# Patient Record
Sex: FEMALE | Race: WHITE | NOT HISPANIC OR LATINO | Employment: OTHER | URBAN - METROPOLITAN AREA
[De-identification: names, ages, dates, MRNs, and addresses within clinical notes are randomized per-mention and may not be internally consistent; named-entity substitution may affect disease eponyms.]

---

## 2022-04-22 ENCOUNTER — APPOINTMENT (OUTPATIENT)
Dept: CT IMAGING | Facility: CLINIC | Age: 87
DRG: 193 | End: 2022-04-22
Attending: EMERGENCY MEDICINE
Payer: COMMERCIAL

## 2022-04-22 ENCOUNTER — HOSPITAL ENCOUNTER (INPATIENT)
Facility: CLINIC | Age: 87
LOS: 6 days | Discharge: HOME | DRG: 193 | End: 2022-04-28
Attending: EMERGENCY MEDICINE | Admitting: INTERNAL MEDICINE
Payer: COMMERCIAL

## 2022-04-22 ENCOUNTER — APPOINTMENT (OUTPATIENT)
Dept: ULTRASOUND IMAGING | Facility: CLINIC | Age: 87
DRG: 193 | End: 2022-04-22
Attending: INTERNAL MEDICINE
Payer: COMMERCIAL
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
## 2022-04-22 DIAGNOSIS — I50.21 ACUTE SYSTOLIC CONGESTIVE HEART FAILURE (H): ICD-10-CM

## 2022-04-22 DIAGNOSIS — J18.9 PNEUMONIA OF BOTH LOWER LOBES DUE TO INFECTIOUS ORGANISM: ICD-10-CM

## 2022-04-22 DIAGNOSIS — R06.00 DYSPNEA, UNSPECIFIED TYPE: ICD-10-CM

## 2022-04-22 DIAGNOSIS — J18.9 COMMUNITY ACQUIRED PNEUMONIA, UNSPECIFIED LATERALITY: Primary | ICD-10-CM

## 2022-04-22 DIAGNOSIS — K21.00 GASTROESOPHAGEAL REFLUX DISEASE WITH ESOPHAGITIS, UNSPECIFIED WHETHER HEMORRHAGE: ICD-10-CM

## 2022-04-22 PROBLEM — E83.39 HYPERPHOSPHATEMIA: Status: ACTIVE | Noted: 2020-08-20

## 2022-04-22 PROBLEM — N18.4 CHRONIC KIDNEY DISEASE, STAGE 4 (SEVERE) (H): Status: ACTIVE | Noted: 2019-12-31

## 2022-04-22 PROBLEM — D84.9 IMMUNOSUPPRESSION (H): Status: ACTIVE | Noted: 2020-02-06

## 2022-04-22 PROBLEM — D64.9: Status: ACTIVE | Noted: 2020-12-01

## 2022-04-22 PROBLEM — I21.21: Status: ACTIVE | Noted: 2018-10-11

## 2022-04-22 PROBLEM — R06.09 DOE (DYSPNEA ON EXERTION): Status: ACTIVE | Noted: 2019-12-31

## 2022-04-22 PROBLEM — D50.9 ANEMIA, IRON DEFICIENCY: Status: ACTIVE | Noted: 2020-06-24

## 2022-04-22 PROBLEM — E56.9 VITAMIN DEFICIENCY: Status: ACTIVE | Noted: 2022-03-07

## 2022-04-22 PROBLEM — E66.9 OBESITY: Status: ACTIVE | Noted: 2019-12-31

## 2022-04-22 PROBLEM — Z96.651 HISTORY OF TOTAL RIGHT KNEE REPLACEMENT: Status: ACTIVE | Noted: 2021-02-11

## 2022-04-22 PROBLEM — R10.9 ABDOMINAL PAIN: Status: ACTIVE | Noted: 2020-06-08

## 2022-04-22 PROBLEM — R42 VERTIGO: Status: ACTIVE | Noted: 2020-02-04

## 2022-04-22 PROBLEM — E87.70 HYPERVOLEMIA: Status: ACTIVE | Noted: 2020-02-27

## 2022-04-22 PROBLEM — L29.9 PRURITUS: Status: ACTIVE | Noted: 2020-02-04

## 2022-04-22 PROBLEM — K57.92 DIVERTICULITIS: Status: ACTIVE | Noted: 2020-06-08

## 2022-04-22 PROBLEM — M17.12 PRIMARY OSTEOARTHRITIS OF LEFT KNEE: Status: ACTIVE | Noted: 2021-02-11

## 2022-04-22 PROBLEM — E78.5 HLD (HYPERLIPIDEMIA): Status: ACTIVE | Noted: 2020-01-01

## 2022-04-22 PROBLEM — N25.81 SECONDARY HYPERPARATHYROIDISM OF RENAL ORIGIN (H): Status: ACTIVE | Noted: 2020-02-04

## 2022-04-22 LAB
ANION GAP SERPL CALCULATED.3IONS-SCNC: 15 MMOL/L (ref 5–18)
ATRIAL RATE - MUSE: 53 BPM
BASOPHILS # BLD AUTO: 0 10E3/UL (ref 0–0.2)
BASOPHILS NFR BLD AUTO: 0 %
BNP SERPL-MCNC: 940 PG/ML (ref 0–167)
BUN SERPL-MCNC: 34 MG/DL (ref 8–28)
CALCIUM SERPL-MCNC: 8.3 MG/DL (ref 8.5–10.5)
CHLORIDE BLD-SCNC: 102 MMOL/L (ref 98–107)
CK SERPL-CCNC: 549 U/L (ref 30–190)
CO2 SERPL-SCNC: 22 MMOL/L (ref 22–31)
CREAT SERPL-MCNC: 1.96 MG/DL (ref 0.6–1.1)
DIASTOLIC BLOOD PRESSURE - MUSE: NORMAL MMHG
EOSINOPHIL # BLD AUTO: 0.1 10E3/UL (ref 0–0.7)
EOSINOPHIL NFR BLD AUTO: 0 %
ERYTHROCYTE [DISTWIDTH] IN BLOOD BY AUTOMATED COUNT: 16.2 % (ref 10–15)
ERYTHROCYTE [DISTWIDTH] IN BLOOD BY AUTOMATED COUNT: 16.2 % (ref 10–15)
GFR SERPL CREATININE-BSD FRML MDRD: 24 ML/MIN/1.73M2
GLUCOSE BLD-MCNC: 104 MG/DL (ref 70–125)
HCT VFR BLD AUTO: 28.6 % (ref 35–47)
HCT VFR BLD AUTO: 28.6 % (ref 35–47)
HGB BLD-MCNC: 9.2 G/DL (ref 11.7–15.7)
HGB BLD-MCNC: 9.3 G/DL (ref 11.7–15.7)
IMM GRANULOCYTES # BLD: 0.1 10E3/UL
IMM GRANULOCYTES NFR BLD: 1 %
INTERPRETATION ECG - MUSE: NORMAL
LACTATE SERPL-SCNC: 1.5 MMOL/L (ref 0.7–2)
LYMPHOCYTES # BLD AUTO: 1.7 10E3/UL (ref 0.8–5.3)
LYMPHOCYTES NFR BLD AUTO: 12 %
MAGNESIUM SERPL-MCNC: 1.8 MG/DL (ref 1.8–2.6)
MCH RBC QN AUTO: 27.5 PG (ref 26.5–33)
MCH RBC QN AUTO: 27.7 PG (ref 26.5–33)
MCHC RBC AUTO-ENTMCNC: 32.2 G/DL (ref 31.5–36.5)
MCHC RBC AUTO-ENTMCNC: 32.5 G/DL (ref 31.5–36.5)
MCV RBC AUTO: 85 FL (ref 78–100)
MCV RBC AUTO: 86 FL (ref 78–100)
MONOCYTES # BLD AUTO: 1.5 10E3/UL (ref 0–1.3)
MONOCYTES NFR BLD AUTO: 10 %
NEUTROPHILS # BLD AUTO: 11.4 10E3/UL (ref 1.6–8.3)
NEUTROPHILS NFR BLD AUTO: 77 %
NRBC # BLD AUTO: 0 10E3/UL
NRBC BLD AUTO-RTO: 0 /100
P AXIS - MUSE: 69 DEGREES
PLATELET # BLD AUTO: 186 10E3/UL (ref 150–450)
PLATELET # BLD AUTO: 223 10E3/UL (ref 150–450)
POTASSIUM BLD-SCNC: 3.7 MMOL/L (ref 3.5–5)
PR INTERVAL - MUSE: 160 MS
QRS DURATION - MUSE: 90 MS
QT - MUSE: 694 MS
QTC - MUSE: 651 MS
R AXIS - MUSE: 47 DEGREES
RBC # BLD AUTO: 3.34 10E6/UL (ref 3.8–5.2)
RBC # BLD AUTO: 3.36 10E6/UL (ref 3.8–5.2)
SODIUM SERPL-SCNC: 139 MMOL/L (ref 136–145)
SYSTOLIC BLOOD PRESSURE - MUSE: NORMAL MMHG
T AXIS - MUSE: 59 DEGREES
TROPONIN I SERPL-MCNC: 0.08 NG/ML (ref 0–0.29)
VENTRICULAR RATE- MUSE: 53 BPM
WBC # BLD AUTO: 14.7 10E3/UL (ref 4–11)
WBC # BLD AUTO: 15.7 10E3/UL (ref 4–11)

## 2022-04-22 PROCEDURE — 80048 BASIC METABOLIC PNL TOTAL CA: CPT | Performed by: EMERGENCY MEDICINE

## 2022-04-22 PROCEDURE — 93005 ELECTROCARDIOGRAM TRACING: CPT

## 2022-04-22 PROCEDURE — 87077 CULTURE AEROBIC IDENTIFY: CPT | Performed by: INTERNAL MEDICINE

## 2022-04-22 PROCEDURE — 93010 ELECTROCARDIOGRAM REPORT: CPT | Performed by: INTERNAL MEDICINE

## 2022-04-22 PROCEDURE — 85027 COMPLETE CBC AUTOMATED: CPT | Performed by: INTERNAL MEDICINE

## 2022-04-22 PROCEDURE — 99223 1ST HOSP IP/OBS HIGH 75: CPT | Mod: AI | Performed by: INTERNAL MEDICINE

## 2022-04-22 PROCEDURE — 87149 DNA/RNA DIRECT PROBE: CPT | Performed by: INTERNAL MEDICINE

## 2022-04-22 PROCEDURE — 93005 ELECTROCARDIOGRAM TRACING: CPT | Performed by: EMERGENCY MEDICINE

## 2022-04-22 PROCEDURE — 94640 AIRWAY INHALATION TREATMENT: CPT

## 2022-04-22 PROCEDURE — 83605 ASSAY OF LACTIC ACID: CPT | Performed by: EMERGENCY MEDICINE

## 2022-04-22 PROCEDURE — 84145 PROCALCITONIN (PCT): CPT | Performed by: INTERNAL MEDICINE

## 2022-04-22 PROCEDURE — 250N000011 HC RX IP 250 OP 636: Performed by: EMERGENCY MEDICINE

## 2022-04-22 PROCEDURE — 99285 EMERGENCY DEPT VISIT HI MDM: CPT | Mod: 25

## 2022-04-22 PROCEDURE — 250N000013 HC RX MED GY IP 250 OP 250 PS 637: Performed by: EMERGENCY MEDICINE

## 2022-04-22 PROCEDURE — 36415 COLL VENOUS BLD VENIPUNCTURE: CPT | Performed by: EMERGENCY MEDICINE

## 2022-04-22 PROCEDURE — 84484 ASSAY OF TROPONIN QUANT: CPT | Performed by: EMERGENCY MEDICINE

## 2022-04-22 PROCEDURE — 250N000009 HC RX 250: Performed by: EMERGENCY MEDICINE

## 2022-04-22 PROCEDURE — 120N000001 HC R&B MED SURG/OB

## 2022-04-22 PROCEDURE — 85025 COMPLETE CBC W/AUTO DIFF WBC: CPT | Performed by: EMERGENCY MEDICINE

## 2022-04-22 PROCEDURE — 82550 ASSAY OF CK (CPK): CPT | Performed by: EMERGENCY MEDICINE

## 2022-04-22 PROCEDURE — 83880 ASSAY OF NATRIURETIC PEPTIDE: CPT | Performed by: EMERGENCY MEDICINE

## 2022-04-22 PROCEDURE — 83735 ASSAY OF MAGNESIUM: CPT | Performed by: EMERGENCY MEDICINE

## 2022-04-22 PROCEDURE — 71275 CT ANGIOGRAPHY CHEST: CPT

## 2022-04-22 PROCEDURE — 0HQ0XZZ REPAIR SCALP SKIN, EXTERNAL APPROACH: ICD-10-PCS | Performed by: EMERGENCY MEDICINE

## 2022-04-22 PROCEDURE — 93970 EXTREMITY STUDY: CPT

## 2022-04-22 RX ORDER — IPRATROPIUM BROMIDE AND ALBUTEROL SULFATE 2.5; .5 MG/3ML; MG/3ML
3 SOLUTION RESPIRATORY (INHALATION) ONCE
Status: COMPLETED | OUTPATIENT
Start: 2022-04-22 | End: 2022-04-22

## 2022-04-22 RX ORDER — GABAPENTIN 100 MG/1
100 CAPSULE ORAL 2 TIMES DAILY
COMMUNITY
Start: 2022-03-28

## 2022-04-22 RX ORDER — DOXYCYCLINE 100 MG/10ML
100 INJECTION, POWDER, LYOPHILIZED, FOR SOLUTION INTRAVENOUS EVERY 12 HOURS
Status: DISCONTINUED | OUTPATIENT
Start: 2022-04-22 | End: 2022-04-25

## 2022-04-22 RX ORDER — IOPAMIDOL 755 MG/ML
100 INJECTION, SOLUTION INTRAVASCULAR ONCE
Status: COMPLETED | OUTPATIENT
Start: 2022-04-22 | End: 2022-04-22

## 2022-04-22 RX ORDER — TORSEMIDE 20 MG/1
40 TABLET ORAL DAILY
Status: DISCONTINUED | OUTPATIENT
Start: 2022-04-23 | End: 2022-04-28 | Stop reason: HOSPADM

## 2022-04-22 RX ORDER — FAMOTIDINE 20 MG/1
20 TABLET, FILM COATED ORAL 2 TIMES DAILY
Status: ON HOLD | COMMUNITY
Start: 2022-04-04 | End: 2022-04-28

## 2022-04-22 RX ORDER — BENZONATATE 100 MG/1
100 CAPSULE ORAL 3 TIMES DAILY PRN
Status: DISCONTINUED | OUTPATIENT
Start: 2022-04-22 | End: 2022-04-28 | Stop reason: HOSPADM

## 2022-04-22 RX ORDER — CARVEDILOL 25 MG/1
25 TABLET ORAL 2 TIMES DAILY
COMMUNITY
Start: 2022-03-25

## 2022-04-22 RX ORDER — CEFTRIAXONE 2 G/1
2 INJECTION, POWDER, FOR SOLUTION INTRAMUSCULAR; INTRAVENOUS EVERY 24 HOURS
Status: DISCONTINUED | OUTPATIENT
Start: 2022-04-22 | End: 2022-04-28 | Stop reason: HOSPADM

## 2022-04-22 RX ORDER — ACETAMINOPHEN 500 MG
1000 TABLET ORAL 3 TIMES DAILY
COMMUNITY

## 2022-04-22 RX ORDER — FAMOTIDINE 20 MG/1
20 TABLET, FILM COATED ORAL DAILY
Status: DISCONTINUED | OUTPATIENT
Start: 2022-04-23 | End: 2022-04-24

## 2022-04-22 RX ORDER — CARVEDILOL 25 MG/1
25 TABLET ORAL 2 TIMES DAILY
Status: DISCONTINUED | OUTPATIENT
Start: 2022-04-23 | End: 2022-04-28 | Stop reason: HOSPADM

## 2022-04-22 RX ORDER — TORSEMIDE 20 MG/1
20-40 TABLET ORAL SEE ADMIN INSTRUCTIONS
Status: DISCONTINUED | OUTPATIENT
Start: 2022-04-22 | End: 2022-04-22

## 2022-04-22 RX ORDER — TORSEMIDE 20 MG/1
20-40 TABLET ORAL SEE ADMIN INSTRUCTIONS
COMMUNITY
Start: 2022-04-16

## 2022-04-22 RX ORDER — ACETAMINOPHEN 500 MG
1000 TABLET ORAL 3 TIMES DAILY
Status: DISCONTINUED | OUTPATIENT
Start: 2022-04-23 | End: 2022-04-28 | Stop reason: HOSPADM

## 2022-04-22 RX ORDER — HEPARIN SODIUM 5000 [USP'U]/.5ML
5000 INJECTION, SOLUTION INTRAVENOUS; SUBCUTANEOUS EVERY 12 HOURS
Status: DISCONTINUED | OUTPATIENT
Start: 2022-04-22 | End: 2022-04-28 | Stop reason: HOSPADM

## 2022-04-22 RX ORDER — ROSUVASTATIN CALCIUM 5 MG/1
5 TABLET, COATED ORAL AT BEDTIME
COMMUNITY
Start: 2022-02-19

## 2022-04-22 RX ORDER — ACETAMINOPHEN 650 MG/1
650 SUPPOSITORY RECTAL EVERY 6 HOURS PRN
Status: DISCONTINUED | OUTPATIENT
Start: 2022-04-22 | End: 2022-04-28 | Stop reason: HOSPADM

## 2022-04-22 RX ORDER — LEVOTHYROXINE SODIUM 50 UG/1
50 TABLET ORAL DAILY
Status: DISCONTINUED | OUTPATIENT
Start: 2022-04-23 | End: 2022-04-28 | Stop reason: HOSPADM

## 2022-04-22 RX ORDER — TORSEMIDE 20 MG/1
20 TABLET ORAL DAILY
Status: DISCONTINUED | OUTPATIENT
Start: 2022-04-23 | End: 2022-04-28 | Stop reason: HOSPADM

## 2022-04-22 RX ORDER — GABAPENTIN 100 MG/1
100 CAPSULE ORAL 2 TIMES DAILY
Status: DISCONTINUED | OUTPATIENT
Start: 2022-04-22 | End: 2022-04-28 | Stop reason: HOSPADM

## 2022-04-22 RX ORDER — ROSUVASTATIN CALCIUM 5 MG/1
5 TABLET, COATED ORAL AT BEDTIME
Status: DISCONTINUED | OUTPATIENT
Start: 2022-04-22 | End: 2022-04-28 | Stop reason: HOSPADM

## 2022-04-22 RX ORDER — AMLODIPINE BESYLATE 5 MG/1
5 TABLET ORAL 2 TIMES DAILY
Status: DISCONTINUED | OUTPATIENT
Start: 2022-04-22 | End: 2022-04-28 | Stop reason: HOSPADM

## 2022-04-22 RX ORDER — AMLODIPINE BESYLATE 5 MG/1
5 TABLET ORAL 2 TIMES DAILY
COMMUNITY
Start: 2022-02-21

## 2022-04-22 RX ORDER — LIDOCAINE 40 MG/G
CREAM TOPICAL
Status: DISCONTINUED | OUTPATIENT
Start: 2022-04-22 | End: 2022-04-28 | Stop reason: HOSPADM

## 2022-04-22 RX ORDER — ACETAMINOPHEN 325 MG/1
650 TABLET ORAL EVERY 6 HOURS PRN
Status: DISCONTINUED | OUTPATIENT
Start: 2022-04-22 | End: 2022-04-28 | Stop reason: HOSPADM

## 2022-04-22 RX ORDER — LEVOTHYROXINE SODIUM 50 UG/1
50 TABLET ORAL DAILY
COMMUNITY
Start: 2022-02-25

## 2022-04-22 RX ORDER — ACETAMINOPHEN 325 MG/1
650 TABLET ORAL ONCE
Status: COMPLETED | OUTPATIENT
Start: 2022-04-22 | End: 2022-04-22

## 2022-04-22 RX ORDER — ALBUTEROL SULFATE 0.83 MG/ML
2.5 SOLUTION RESPIRATORY (INHALATION) EVERY 6 HOURS PRN
Status: DISCONTINUED | OUTPATIENT
Start: 2022-04-22 | End: 2022-04-23

## 2022-04-22 RX ADMIN — IOPAMIDOL 75 ML: 755 INJECTION, SOLUTION INTRAVENOUS at 21:52

## 2022-04-22 RX ADMIN — ACETAMINOPHEN 650 MG: 325 TABLET ORAL at 19:42

## 2022-04-22 RX ADMIN — IPRATROPIUM BROMIDE AND ALBUTEROL SULFATE 3 ML: 2.5; .5 SOLUTION RESPIRATORY (INHALATION) at 20:27

## 2022-04-22 ASSESSMENT — ACTIVITIES OF DAILY LIVING (ADL)
WEAR_GLASSES_OR_BLIND: NO
DRESSING/BATHING_DIFFICULTY: NO
DOING_ERRANDS_INDEPENDENTLY_DIFFICULTY: NO
DIFFICULTY_EATING/SWALLOWING: NO
TRANSFERRING: 0-->ASSISTANCE NEEDED (DEVELOPMETNALLY APPROPRIATE)
EQUIPMENT_CURRENTLY_USED_AT_HOME: WALKER, ROLLING
NUMBER_OF_TIMES_PATIENT_HAS_FALLEN_WITHIN_LAST_SIX_MONTHS: 1
WALKING_OR_CLIMBING_STAIRS_DIFFICULTY: NO
TOILETING_ISSUES: NO
ADLS_ACUITY_SCORE: 7
ADLS_ACUITY_SCORE: 4
FALL_HISTORY_WITHIN_LAST_SIX_MONTHS: YES
TRANSFERRING: 1-->ASSISTANCE (EQUIPMENT/PERSON) NEEDED
CONCENTRATING,_REMEMBERING_OR_MAKING_DECISIONS_DIFFICULTY: NO
ADLS_ACUITY_SCORE: 7
CHANGE_IN_FUNCTIONAL_STATUS_SINCE_ONSET_OF_CURRENT_ILLNESS/INJURY: NO

## 2022-04-22 ASSESSMENT — ENCOUNTER SYMPTOMS
HEMATURIA: 0
DYSURIA: 0
DIARRHEA: 1
SHORTNESS OF BREATH: 1
ABDOMINAL PAIN: 0
COUGH: 1

## 2022-04-22 NOTE — ED TRIAGE NOTES
Patient is here from the clinic with dx of pneumonia. She does feel short of breath with a cough. She did fall last night and laid on the floor for two hours. She also has a laceration to the back of the head which the clinic put staples in.

## 2022-04-22 NOTE — ED PROVIDER NOTES
Expected Patient Referral to ED  4:33 PM    Referring Clinic/Provider:  [facility]     Reason for referral/Clinical facts:  Fell at 2 AM, denies LOC, has scalp laceration. Was on ground for 2 hours last night, WBC elevated. CXR shows infiltrates. Lives alone. CT head is negative. Needs admission for IVFs, abx. Tried direct admission but admitting doc wanted chest CT.     Recommendations provided:  Send to ED for further evaluation    Caller was informed that this institution does possess the capabilities and/or resources to provide for patient and should be transferred to our facility.    Discussed that if direct admit is sought and any hurdles are encountered, this ED would be happy to see the patient and evaluate.    Informed caller that recommendations provided are recommendations based only on the facts provided and that they responsible to accept or reject the advice, or to seek a formal in person consultation as needed and that this ED will see/treat patient should they arrive.      EBER GIBSON MD  Emergency Medicine  [facility] EMERGENCY ROOM  [address] [facility] [id number] [phone]       Eber Gibson MD  04/22/22 2127

## 2022-04-23 LAB
ALBUMIN SERPL-MCNC: 2.9 G/DL (ref 3.5–5)
ALP SERPL-CCNC: 76 U/L (ref 45–120)
ALT SERPL W P-5'-P-CCNC: 14 U/L (ref 0–45)
ANION GAP SERPL CALCULATED.3IONS-SCNC: 14 MMOL/L (ref 5–18)
AST SERPL W P-5'-P-CCNC: 29 U/L (ref 0–40)
BASE EXCESS BLDV CALC-SCNC: 0.5 MMOL/L
BILIRUB SERPL-MCNC: 0.6 MG/DL (ref 0–1)
BUN SERPL-MCNC: 33 MG/DL (ref 8–28)
C PNEUM DNA SPEC QL NAA+PROBE: NOT DETECTED
CALCIUM SERPL-MCNC: 8.5 MG/DL (ref 8.5–10.5)
CHLORIDE BLD-SCNC: 103 MMOL/L (ref 98–107)
CK SERPL-CCNC: 415 U/L (ref 30–190)
CO2 SERPL-SCNC: 22 MMOL/L (ref 22–31)
CREAT SERPL-MCNC: 1.9 MG/DL (ref 0.6–1.1)
FLUAV H1 2009 PAND RNA SPEC QL NAA+PROBE: NOT DETECTED
FLUAV H1 RNA SPEC QL NAA+PROBE: NOT DETECTED
FLUAV H3 RNA SPEC QL NAA+PROBE: NOT DETECTED
FLUAV RNA SPEC QL NAA+PROBE: NOT DETECTED
FLUBV RNA SPEC QL NAA+PROBE: NOT DETECTED
GFR SERPL CREATININE-BSD FRML MDRD: 25 ML/MIN/1.73M2
GLUCOSE BLD-MCNC: 107 MG/DL (ref 70–125)
HADV DNA SPEC QL NAA+PROBE: NOT DETECTED
HCO3 BLDV-SCNC: 24 MMOL/L (ref 24–30)
HCOV PNL SPEC NAA+PROBE: NOT DETECTED
HMPV RNA SPEC QL NAA+PROBE: NOT DETECTED
HOLD SPECIMEN: NORMAL
HPIV1 RNA SPEC QL NAA+PROBE: NOT DETECTED
HPIV2 RNA SPEC QL NAA+PROBE: NOT DETECTED
HPIV3 RNA SPEC QL NAA+PROBE: NOT DETECTED
HPIV4 RNA SPEC QL NAA+PROBE: NOT DETECTED
LACTATE SERPL-SCNC: 1.4 MMOL/L (ref 0.7–2)
M PNEUMO DNA SPEC QL NAA+PROBE: NOT DETECTED
MAGNESIUM SERPL-MCNC: 1.8 MG/DL (ref 1.8–2.6)
OXYHGB MFR BLDV: 75.1 % (ref 70–75)
PCO2 BLDV: 43 MM HG (ref 35–50)
PH BLDV: 7.38 [PH] (ref 7.35–7.45)
PO2 BLDV: 40 MM HG (ref 25–47)
POTASSIUM BLD-SCNC: 3.6 MMOL/L (ref 3.5–5)
PROCALCITONIN SERPL-MCNC: 2.74 NG/ML (ref 0–0.49)
PROT SERPL-MCNC: 6.2 G/DL (ref 6–8)
RSV RNA SPEC QL NAA+PROBE: NOT DETECTED
RSV RNA SPEC QL NAA+PROBE: NOT DETECTED
RV+EV RNA SPEC QL NAA+PROBE: NOT DETECTED
SAO2 % BLDV: 76.7 % (ref 70–75)
SODIUM SERPL-SCNC: 139 MMOL/L (ref 136–145)
TROPONIN I SERPL-MCNC: 0.07 NG/ML (ref 0–0.29)

## 2022-04-23 PROCEDURE — 36415 COLL VENOUS BLD VENIPUNCTURE: CPT | Performed by: INTERNAL MEDICINE

## 2022-04-23 PROCEDURE — 5A09357 ASSISTANCE WITH RESPIRATORY VENTILATION, LESS THAN 24 CONSECUTIVE HOURS, CONTINUOUS POSITIVE AIRWAY PRESSURE: ICD-10-PCS | Performed by: INTERNAL MEDICINE

## 2022-04-23 PROCEDURE — 87581 M.PNEUMON DNA AMP PROBE: CPT | Performed by: INTERNAL MEDICINE

## 2022-04-23 PROCEDURE — 99233 SBSQ HOSP IP/OBS HIGH 50: CPT | Performed by: INTERNAL MEDICINE

## 2022-04-23 PROCEDURE — 999N000157 HC STATISTIC RCP TIME EA 10 MIN

## 2022-04-23 PROCEDURE — 83605 ASSAY OF LACTIC ACID: CPT | Performed by: INTERNAL MEDICINE

## 2022-04-23 PROCEDURE — 120N000001 HC R&B MED SURG/OB

## 2022-04-23 PROCEDURE — 82805 BLOOD GASES W/O2 SATURATION: CPT | Performed by: INTERNAL MEDICINE

## 2022-04-23 PROCEDURE — 80053 COMPREHEN METABOLIC PANEL: CPT | Performed by: INTERNAL MEDICINE

## 2022-04-23 PROCEDURE — 82550 ASSAY OF CK (CPK): CPT | Performed by: INTERNAL MEDICINE

## 2022-04-23 PROCEDURE — 83735 ASSAY OF MAGNESIUM: CPT | Performed by: INTERNAL MEDICINE

## 2022-04-23 PROCEDURE — 999N000156 HC STATISTIC RCP CONSULT EA 30 MIN

## 2022-04-23 PROCEDURE — 87205 SMEAR GRAM STAIN: CPT | Performed by: INTERNAL MEDICINE

## 2022-04-23 PROCEDURE — 250N000011 HC RX IP 250 OP 636: Performed by: INTERNAL MEDICINE

## 2022-04-23 PROCEDURE — 250N000013 HC RX MED GY IP 250 OP 250 PS 637: Performed by: INTERNAL MEDICINE

## 2022-04-23 PROCEDURE — 94640 AIRWAY INHALATION TREATMENT: CPT

## 2022-04-23 PROCEDURE — 94660 CPAP INITIATION&MGMT: CPT

## 2022-04-23 PROCEDURE — 84484 ASSAY OF TROPONIN QUANT: CPT | Performed by: INTERNAL MEDICINE

## 2022-04-23 PROCEDURE — 87040 BLOOD CULTURE FOR BACTERIA: CPT | Performed by: INTERNAL MEDICINE

## 2022-04-23 PROCEDURE — 250N000009 HC RX 250: Performed by: INTERNAL MEDICINE

## 2022-04-23 RX ORDER — ALBUTEROL SULFATE 0.83 MG/ML
2.5 SOLUTION RESPIRATORY (INHALATION) EVERY 6 HOURS PRN
Status: DISCONTINUED | OUTPATIENT
Start: 2022-04-23 | End: 2022-04-23

## 2022-04-23 RX ORDER — MINERAL OIL/HYDROPHIL PETROLAT
OINTMENT (GRAM) TOPICAL DAILY
Status: DISCONTINUED | OUTPATIENT
Start: 2022-04-23 | End: 2022-04-28 | Stop reason: HOSPADM

## 2022-04-23 RX ORDER — ALBUTEROL SULFATE 90 UG/1
2 AEROSOL, METERED RESPIRATORY (INHALATION) EVERY 6 HOURS PRN
Status: DISCONTINUED | OUTPATIENT
Start: 2022-04-23 | End: 2022-04-28 | Stop reason: HOSPADM

## 2022-04-23 RX ADMIN — GUAIFENESIN 10 ML: 100 SOLUTION ORAL at 14:05

## 2022-04-23 RX ADMIN — BENZONATATE 100 MG: 100 CAPSULE ORAL at 01:17

## 2022-04-23 RX ADMIN — GABAPENTIN 100 MG: 100 CAPSULE ORAL at 01:16

## 2022-04-23 RX ADMIN — FAMOTIDINE 20 MG: 20 TABLET ORAL at 08:05

## 2022-04-23 RX ADMIN — HEPARIN SODIUM 5000 UNITS: 5000 INJECTION, SOLUTION INTRAVENOUS; SUBCUTANEOUS at 21:54

## 2022-04-23 RX ADMIN — CEFTRIAXONE SODIUM 2 G: 2 INJECTION, POWDER, FOR SOLUTION INTRAMUSCULAR; INTRAVENOUS at 01:19

## 2022-04-23 RX ADMIN — DOXYCYCLINE 100 MG: 100 INJECTION, POWDER, LYOPHILIZED, FOR SOLUTION INTRAVENOUS at 02:14

## 2022-04-23 RX ADMIN — ACETAMINOPHEN 1000 MG: 500 TABLET, FILM COATED ORAL at 13:45

## 2022-04-23 RX ADMIN — AMLODIPINE BESYLATE 5 MG: 5 TABLET ORAL at 01:16

## 2022-04-23 RX ADMIN — CARVEDILOL 25 MG: 25 TABLET, FILM COATED ORAL at 08:05

## 2022-04-23 RX ADMIN — AMLODIPINE BESYLATE 5 MG: 5 TABLET ORAL at 08:04

## 2022-04-23 RX ADMIN — ACETAMINOPHEN 1000 MG: 500 TABLET, FILM COATED ORAL at 20:54

## 2022-04-23 RX ADMIN — TORSEMIDE 20 MG: 20 TABLET ORAL at 17:07

## 2022-04-23 RX ADMIN — ALBUTEROL SULFATE 2.5 MG: 2.5 SOLUTION RESPIRATORY (INHALATION) at 17:46

## 2022-04-23 RX ADMIN — GABAPENTIN 100 MG: 100 CAPSULE ORAL at 08:05

## 2022-04-23 RX ADMIN — CEFTRIAXONE SODIUM 2 G: 2 INJECTION, POWDER, FOR SOLUTION INTRAMUSCULAR; INTRAVENOUS at 21:50

## 2022-04-23 RX ADMIN — GABAPENTIN 100 MG: 100 CAPSULE ORAL at 20:54

## 2022-04-23 RX ADMIN — AMLODIPINE BESYLATE 5 MG: 5 TABLET ORAL at 20:54

## 2022-04-23 RX ADMIN — ACETAMINOPHEN 650 MG: 325 TABLET ORAL at 01:26

## 2022-04-23 RX ADMIN — ALBUTEROL SULFATE 2 PUFF: 90 AEROSOL, METERED RESPIRATORY (INHALATION) at 21:59

## 2022-04-23 RX ADMIN — LEVOTHYROXINE SODIUM 50 MCG: 0.05 TABLET ORAL at 06:50

## 2022-04-23 RX ADMIN — CARVEDILOL 25 MG: 25 TABLET, FILM COATED ORAL at 20:54

## 2022-04-23 RX ADMIN — WHITE PETROLATUM: 1.75 OINTMENT TOPICAL at 13:45

## 2022-04-23 RX ADMIN — DOXYCYCLINE 100 MG: 100 INJECTION, POWDER, LYOPHILIZED, FOR SOLUTION INTRAVENOUS at 14:39

## 2022-04-23 RX ADMIN — HEPARIN SODIUM 5000 UNITS: 5000 INJECTION, SOLUTION INTRAVENOUS; SUBCUTANEOUS at 01:19

## 2022-04-23 RX ADMIN — HEPARIN SODIUM 5000 UNITS: 5000 INJECTION, SOLUTION INTRAVENOUS; SUBCUTANEOUS at 12:07

## 2022-04-23 RX ADMIN — ACETAMINOPHEN 325 MG: 325 TABLET ORAL at 22:36

## 2022-04-23 RX ADMIN — TORSEMIDE 40 MG: 20 TABLET ORAL at 08:03

## 2022-04-23 RX ADMIN — ACETAMINOPHEN 1000 MG: 500 TABLET, FILM COATED ORAL at 08:04

## 2022-04-23 ASSESSMENT — ACTIVITIES OF DAILY LIVING (ADL)
ADLS_ACUITY_SCORE: 10
ADLS_ACUITY_SCORE: 10
ADLS_ACUITY_SCORE: 4
ADLS_ACUITY_SCORE: 11
ADLS_ACUITY_SCORE: 10
ADLS_ACUITY_SCORE: 6
ADLS_ACUITY_SCORE: 11
ADLS_ACUITY_SCORE: 10
ADLS_ACUITY_SCORE: 14
ADLS_ACUITY_SCORE: 6
ADLS_ACUITY_SCORE: 11
ADLS_ACUITY_SCORE: 14
ADLS_ACUITY_SCORE: 14
ADLS_ACUITY_SCORE: 10
ADLS_ACUITY_SCORE: 11
ADLS_ACUITY_SCORE: 10
ADLS_ACUITY_SCORE: 4
ADLS_ACUITY_SCORE: 10
ADLS_ACUITY_SCORE: 10
ADLS_ACUITY_SCORE: 11
ADLS_ACUITY_SCORE: 10
ADLS_ACUITY_SCORE: 10

## 2022-04-23 NOTE — PROVIDER NOTIFICATION
RCAT Treatment Plan    Patient Score: 6    Patient Acuity: 4    Clinical Indication for Therapy: CHF    Therapy Ordered: PRN nebs    Assessment Summary: RCAT complete. Pt qualifies for PRN nebs. BS diminished with faint wheeze. Pt remains on 2 lpm. RT will continue to monitor.       04/23/22 1353   RCAT Assessment   Reason for Assessment CHF   Pulmonary Status 0   Surgical Status 0   Chest X-ray 0   Respiratory Pattern 0   Mental Status 0   Breath Sounds 4   Cough Effectiveness 0   Level of Activity 1   O2 Required for SpO2>=92% 1   Acuity Level (points) 6   Acuity Level  4         Chula Villatoro, RT    4/23/2022

## 2022-04-23 NOTE — PROGRESS NOTES
[facility] Daily Progress Note    Assessment/Plan:  89-year-old female with history of heart failure preserved ejection fraction, chronic kidney disease stage IV and history of necrotizing crescentic microscopic polyangiitis, anemia of chronic disease, who presented after suffering a fall in her home with scalp laceration.  Patient was noted to have mild elevation in total CK possible mild rhabdomyolysis.  Diagnosed with bilateral community-acquired pneumonia.    Bilateral community-acquired pneumonia  Acute respiratory failure with hypoxia  Procalcitonin elevated at 2.74.  White blood cell count elevated at 14.7  Respiratory panel PCR pending  Sputum culture pending specimen.  COVID PCR and influenza screens negative  Continue ceftriaxone 2 g IV every 24 hours, doxycycline 100 mg every 12 hours  Albuterol neb every 6 hours as needed  RCAT  Incentive spirometry  Will require repeat CT scan in 4 to 6 months to ensure resolution of pneumonia.    Possible acute on chronic diastolic congestive heart failure  Echocardiogram January 18, 2022, LVEF 71%, grade 1 left ventricular diastolic dysfunction.  No significant valvular disease.   and patient with chronic kidney disease.  Dr. Grey notation indicates elevated troponin however I have reviewed troponins available and no significant elevation was noted.  Bilateral lung infiltrates likely consistent with multifocal pneumonia.  Small pleural effusion noted.  Order torsemide 60 mg daily    Mild elevation in total CK  Likely mild rhabdomyolysis from recent fall.  Total CK improved from 549->415.  Hold rosuvastatin 5 mg nightly    Fall with scalp laceration  Status post suture placement in the ED.  Fall precautions  Lower extremity ultrasound negative for DVT  PT OT assessment tomorrow.    Chronic anemia normocytic.  Anemia of chronic kidney disease  Hemoglobin 9.2 g/dL  Monitor CBC    Obstructive sleep apnea on CPAP  Continue home CPAP home settings.    Chronic kidney  disease stage IV  History of necrotizing crescentic microscopic polyangiitis  Follows with Dr. Deandre Vernon, [facility]  Baseline creatinine around 2.0.  Creatinine 1.9 currently, stable.  Monitor renal function panel daily    Hypothyroidism  Hyperlipidemia  Fibromyalgia  Celiac sprue  Paroxysmal atrial fibrillation  Continue levothyroxine 50 mcg daily.  Continue gabapentin 100 mg twice daily  Continue famotidine 20 mg daily  Continue carvedilol 25 mg twice daily  Continue amlodipine 5 mg twice    COVID-19 status  SARS-CoV-2 PCR negative.    Diet: Combination Diet Low Saturated Fat Na <2400mg Diet, No Caffeine Diet  DVT Prophylaxis:  Pneumatic Compression Devices, fall risk  Code Status: Full Code    Active Problems:    Fall, initial encounter    Pneumonia of both lower lobes due to infectious organism    Dyspnea, unspecified type     LOS: 1 day     Barriers to discharge: Respiratory failure with hypoxia, bilateral community acquired pneumonia  Discharge Disposition: Pending PT OT assessment clinical improvement.  Patient will likely require increased services at home.      Subjective:  Ykui is currently on 5-1/2 L supplemental oxygen via nasal cannula.  She is saturating 92 to 94% during our conversation.  She reports improvement in cough.  Denies chest pain or shortness of breath at rest.  Patient is compliant with CPAP usage.  Denies any recent sick contacts.  Patient felt like she had a cold prior to her assessment in the emergency room.      acetaminophen  1,000 mg Oral TID     amLODIPine  5 mg Oral BID     carvedilol  25 mg Oral BID     cefTRIAXone  2 g Intravenous Q24H     doxycycline (VIBRAMYCIN) IV  100 mg Intravenous Q12H     famotidine  20 mg Oral Daily     gabapentin  100 mg Oral BID     heparin ANTICOAGULANT  5,000 Units Subcutaneous Q12H     levothyroxine  50 mcg Oral Daily     [Held by provider] rosuvastatin  5 mg Oral At Bedtime     sodium chloride (PF)  3 mL Intracatheter Q8H      torsemide  20 mg Oral Daily     torsemide  40 mg Oral Daily       Objective:  Vital signs in last 24 hours:  Temp:  [97.6  F (36.4  C)-99.8  F (37.7  C)] 99.8  F (37.7  C)  Pulse:  [51-82] 82  Resp:  [13-29] 20  BP: (122-146)/(58-78) 143/62  SpO2:  [91 %-96 %] 92 %  Weight:   Weight:   @THISENCWEIGHTS(1)@  Weight change:   Body mass index is 36.39 kg/m .    Intake/Output last 3 shifts:  I/O last 3 completed shifts:  In: 50 [P.O.:50]  Out: 550 [Urine:550]  Intake/Output this shift:  No intake/output data recorded.    Review of Systems:   As per subjective, all others negative.    Physical Exam:    GENERAL:  Alert, appears comfortable, in no acute distress, appears stated age   HEAD:  Normocephalic, skin laceration over scalp with sutures in place.   NECK: Supple, symmetrical, trachea midline   BACK:   Symmetric, no curvature, ROM normal   LUNGS:    Diminished breath sounds, no rales, rhonchi, or wheezing, symmetric chest rise on inhalation, respirations unlabored   CHEST WALL:  No tenderness or deformity   HEART:  Regular rate and rhythm, S1 and S2 normal, no murmur, rub, or gallop    ABDOMEN:   Soft, non-tender, bowel sounds active all four quadrants, no masses, no organomegaly, no rebound or guarding   EXTREMITIES: Extremities normal, atraumatic, no cyanosis or edema.  Dry skin noted over bilateral lower extremities.   SKIN: Dry to touch, no exanthems in the visualized areas   NEURO: Alert, oriented x3, moves all four extremities freely, non-focal   PSYCH: Cooperative, behavior is appropriate      Cardiographics:   I personally reviewed.  ECG: Sinus rhythm, premature atrial contraction, poor baseline EKG with some mild interference.  No obvious ST or T wave changes noted.  When compared to previous EKG no changes noted excluding PAC.    Imaging:  Personally Reviewed.  Results for orders placed or performed during the hospital encounter of 04/22/22   CT Chest Pulmonary Embolism w Contrast    Impression     IMPRESSION:  1.  There is no pulmonary embolus, aortic aneurysm or dissection.  2.  Bilateral multifocal pneumonia.  3.  Small right pleural effusion.  4.  Several mildly enlarged mediastinal and right hilar lymph nodes.  5.  Several small indeterminate lung nodules. Follow-up recommended.    Recommendations for one or multiple incidental lung nodules < 6mm :    Low risk patients: No routine follow-up.    High risk patients: Optional follow-up CT at 12 months; if unchanged, no further follow-up.    *Low Risk: Minimal or absent history of smoking or other known risk factors.  *Nonsolid (ground glass) or partly solid nodules may require longer follow-up to exclude indolent adenocarcinoma.  *Recommendations based on Guidelines for the Management of Incidental Pulmonary Nodules Detected at CT: From the Fleischner Society 2017, Radiology 2017.     US Lower Extremity Venous Duplex Bilateral    Impression    IMPRESSION:  1.  No deep venous thrombosis in the bilateral lower extremities.       Lab Results:  Personally Reviewed.   Recent Labs   Lab 04/22/22  2339 04/22/22  1947   WBC 14.7* 15.7*   HGB 9.2* 9.3*   HCT 28.6* 28.6*    223     Recent Labs   Lab 04/23/22  0345 04/22/22 2038    139   CO2 22 22   BUN 33* 34*   ALBUMIN 2.9*  --    ALKPHOS 76  --    ALT 14  --    AST 29  --      No results for input(s): INR in the last 168 hours.    I reviewed all labs and imaging studies as of this date and I reviewed all current inpatient medications and updated them    Sanjay Parson DO, MS  [facility]  Internal Medicine

## 2022-04-23 NOTE — PHARMACY-ADMISSION MEDICATION HISTORY
Pharmacy Note - Admission Medication History    Pertinent Provider Information: none     ______________________________________________________________________    Prior To Admission (PTA) med list completed and updated in EMR.       PTA Med List   Medication Sig Last Dose     acetaminophen (TYLENOL) 500 MG tablet Take 1,000 mg by mouth 3 times daily 4/22/2022 at am     amLODIPine (NORVASC) 5 MG tablet Take 5 mg by mouth 2 times daily 4/22/2022 at am     carvedilol (COREG) 25 MG tablet Take 25 mg by mouth 2 times daily 4/22/2022 at am     famotidine (PEPCID) 20 MG tablet Take 20 mg by mouth 2 times daily 4/22/2022 at am     gabapentin (NEURONTIN) 100 MG capsule Take 100 mg by mouth 2 times daily 4/22/2022 at am     levothyroxine (SYNTHROID/LEVOTHROID) 50 MCG tablet Take 50 mcg by mouth daily 4/22/2022 at Unknown time     Multiple Vitamins-Minerals (CENTRUM SILVER 50+WOMEN PO) Take 1 tablet by mouth daily 4/22/2022 at Unknown time     rosuvastatin (CRESTOR) 5 MG tablet Take 5 mg by mouth At Bedtime 4/21/2022 at Unknown time     torsemide (DEMADEX) 20 MG tablet Take 20-40 mg by mouth See Admin Instructions 2 tabs (40mg) in the morning 1 tab (20 mg) in the evening Continue until your weight is at/below 202 lbs, then stop evening dose and continue only the morning dose of 40 mg once daily. 4/22/2022 at am       Information source(s): Patient and CareEverywhere/[facility]  Method of interview communication: in-person    Summary of Changes to PTA Med List  New: amlodipine, carvedilol, famotidine, gabapentin, levothyroxine, rosuvastatin, torsemide, apap, Centrum womens  Discontinued: none  Changed: amlodipine 10 mg at bedtime to 5 mg bid, gabapentin 200 mg at bedtime to 100 mg bid,     Patient was asked about OTC/herbal products specifically.  PTA med list reflects this.    In the past week, patient estimated taking medication this percent of the time:  greater than 90%.    Allergies were reviewed, assessed, and updated  with the patient.      Patient does not use any multi-dose medications prior to admission.    The information provided in this note is only as accurate as the sources available at the time of the update(s).    Thank you for the opportunity to participate in the care of this patient.    Jessica Ventura [facility]  4/22/2022 8:13 PM

## 2022-04-23 NOTE — PLAN OF CARE
Goal Outcome Evaluation:    Plan of Care Reviewed With: patient      Pt alert and oriented. VSS. Pt on 5L NC, O2 sats at 93%. Pain managed with scheduled tylenol. John cardiac diet. Voiding. Up with assist of 1.

## 2022-04-23 NOTE — PLAN OF CARE
Problem: Plan of Care - These are the overarching goals to be used throughout the patient stay.    Goal: Plan of Care Review/Shift Note    Outcome: Ongoing, Progressing   Goal Outcome Evaluation:      Patient is alert and oriented, patient is an assist of 1 and has generalized weakness. patient has been somewhat painful in the hips was given prn medication, bowel sounds are hypoactive, lung sounds are diminished.

## 2022-04-23 NOTE — PROGRESS NOTES
04/23/22 0221   Tech Time   $Tech Time (10 minute increments) 2   Mode: CPAP/ BiPAP/ AVAPS/ AVAPS AE   CPAP/BiPAP/ AVAPS/ AVAPS AE Mode CPAP   CPAP/BiPAP/Settings   $CPAP/BiPAP Initial completed   BIPAP/CPAP On Standby On   IPAP/EPAP (cmH2O) 5   O2 Flow Rate (L/min) 4   CPAP/BiPAP Patient Parameters   CPAP (cm H2O) 5 cmh2o   RR Total (breaths/min) 19 breaths/min   CPAP/BiPAP/AVAPS/AVAPS AE Alarms   Low Pressure (cm H2O) 5     Patient placed on hospital machine for the night, wears cpap at home.  Will continue to monitor.

## 2022-04-23 NOTE — H&P
[facility] MEDICINE ADMISSION HISTORY AND PHYSICAL       Assessment & Plan      1. Acute/chronic CHF with EF of 71% as of Jan 2022    2. Elevated troponin 1st set, and normalized on repeat, she has history of CAD/PCI/stents    3. WBC of 19 thousand with abnormal chest XR concerns for PNA vs Mass - both upper lobes. She has history of nectrotizing crescentic microscopic polyangiitis 12/2019 on Rituxin and prednisone - sees Pulmonary from [facility]    Seems more of PNA, she persistently coughing, cough suppressants offered And she has worsening SOB in the last 3-4 days. Low suspicion for PE.     4. Severe NABILA on CPAP    5. CKD stage III with anemia on EPO and torsemide, sees [facility]    6. Fall with scalp laceration, s/p suturing - she is GCS 15. No lateralizing or localizing signs     Suturing done at Urgent care --     Description: A simple, superficial clean 3.1 cm laceration. Location: vertex of scalp Closure: Wound was closed in one layer. Skin closed with 5x staples. .    Follow-up: staples to be removed in 10 days    Wound care     7. Prolonged QTc of 65    8. Mild CK elev, holding statins. If rising CKs, consider careful fluid     Plans  1. If not done yet, check blood culture and infectious work up - consider antibiotics - rocephin/doxy   2. Neuro checks if altered - repeat head CT  3. Replace electrolytes carefully - BMP  4. CPAP at HS, if not enough - consider BIPAP  5. AM labs  6. Tele and pulse ox     127A - Leg US - negative. Procalcitonin 2.7           Med reconciliation -- Done   VTE prophylaxis: Heparin subcutaneous    IV fluids: Per order set   Diet: cardiac   Code Status: Full,  COVID test result:  negative   COVID vaccination: completed   Barriers to discharge: admitting clinical condition  Discharge Disposition and goals:  Unable to determine at this point, pending clinical progress and response to treatment. Patient may need transfer to SNF or [facility] if unsafe to  go home and needed treatment inappropriate at home setting OR may need home health care evaluation if care can be delivered at home settings. Consider referral to care manager/    PPE - I was wearing PPE when I met the patient - N95 mask, Surgical mask, Isolation gown, Gloves, Safety glasses.      Care plan was created based on available information provided, including patient's condition at the time of encounter.   This plan was discussed with patient and/or family members using layman's terms and have agreed to proceed.   At the end of night shift (9PM - 730A), this case will be presented to the AM Hospitalist.    It is recommended to revise care plan and review history if there is change in condition and/or new clinical information is not available during my encounter.     All or some of home medication/s were not resumed on admission due to safety reasons or contraindications. Dosing and frequency may also have been modified. Please resume/review them during your visit.     70 minutes of total visit duration and greater than 50% was spent in direct evaluation of patient and coordination of care including discussion of diagnostic test results and recommended treatment. .      Harjit Grey MD, MPH, FACP, [facility]  Internal Medicine - Hospitalist        Chief Complaint Fall      HISTORY     - She was seen initially at urgent care related to a fall early this AM. No LOC. She has 1.5 inch laceration to back top of head. S/P suturing     - Prior to transfer, she has elevated BNP, WBC of 19 thousand, mild elevation of troponin. And has K of 2.9. Head CT was negative for bleed. Her chest XR showed patchy somewhat masslike opacities involving both upper lobes as well as mild diffuse interstitial prominence. No definite pleural effusion.  COVID negative     - When I met her, she was sitting on side of bed. She was coughing incessantly. She has chronic SOB and worse in the last 3-4 days. She has no fever or  chest pain. No abdominal pain. No diarrhea.     - She denies back pain, hip pain or leg pain.     - She has history of CHF and her most recent ECHO - Jan 2022 --  Normal left ventricular systolic function. Calculated left ventricular ejection fraction 71%  No regional wall motion abnormalities.     - In the ED,  Repeat trop was normal. BNP still up. WBC was 15 thousand. I did call the ED Staff Dr Emmanuel about the need for CT chest for PE and indicate the previous hospitalist - Dr Watkins and her, agreed to get this study to evaluate for PE and the ?mass. It appears the chest CT has been done already.     - ROS --- No headache. No dizziness. No weakness. No palpitations. No abdominal pain. No nausea or vomiting. No urinary symptoms. No bleeding symptoms. No weight loss. Rest of 12 point ROS was reviewed and negative.       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   HLD (hyperlipidemia) 01/01/2020   MCGOWAN (dyspnea on exertion) 12/31/2019   Obesity 12/31/2019   Acute systolic congestive heart failure 12/31/2019   Chronic kidney disease, stage 4 (severe) 12/31/2019   Acute ST elevation myocardial infarction (STEMI) involving left circumflex coronary artery 10/11/2018   Keratopathy 08/07/2015   ASCVD (arteriosclerotic cardiovascular disease) 03/31/2015   Vitamin B 12 deficiency 06/19/2012   Unspecified hypothyroidism          Surgical History     TKA     Family History      Other Brother 1   aaa   Other Brother 2   snoring   Diabetes Daughter        Hypertension Daughter        Kidney  cancer Daughter             Social History      .  Social History     Socioeconomic History     Marital status:      Spouse name: Not on file     Number of children: Not on file     Years of education: Not on file     Highest education level: Not on file   Occupational History     Not on file   Tobacco Use     Smoking status: Not on file     Smokeless tobacco: Not on file   Substance and Sexual Activity     Alcohol use: Not on file     Drug use: Not on file     Sexual activity: Not on file   Other Topics Concern     Not on file   Social History Narrative     Not on file     Social Determinants of Health     Financial Resource Strain: Not on file   Food Insecurity: Not on file   Transportation Needs: Not on file   Physical Activity: Not on file   Stress: Not on file   Social Connections: Not on file   Intimate Partner Violence: Not on file   Housing Stability: Not on file          Allergies        Allergies   Allergen Reactions     Codeine      Gluten Meal      Other reaction(s): *Unknown     Isoniazid      Other reaction(s): Hepatic Dysfunction     Lisinopril Cough     Oxycodone Dizziness and Nausea     Pravastatin Muscle Pain (Myalgia)     Simvastatin Muscle Pain (Myalgia)     Terazosin Dizziness and Nausea     Blood pressure went up very high.     Tramadol Dizziness and Nausea         Prior to Admission Medications      No current facility-administered medications on file prior to encounter.  acetaminophen (TYLENOL) 500 MG tablet, Take 1,000 mg by mouth 3 times daily  amLODIPine (NORVASC) 5 MG tablet, Take 5 mg by mouth 2 times daily  carvedilol (COREG) 25 MG tablet, Take 25 mg by mouth 2 times daily  famotidine (PEPCID) 20 MG tablet, Take 20 mg by mouth 2 times daily  gabapentin (NEURONTIN) 100 MG capsule, Take 100 mg by mouth 2 times daily  levothyroxine (SYNTHROID/LEVOTHROID) 50 MCG tablet, Take 50 mcg by mouth daily  Multiple Vitamins-Minerals (CENTRUM SILVER 50+WOMEN PO), Take 1 tablet by mouth  daily  rosuvastatin (CRESTOR) 5 MG tablet, Take 5 mg by mouth At Bedtime  torsemide (DEMADEX) 20 MG tablet, Take 20-40 mg by mouth See Admin Instructions 2 tabs (40mg) in the morning 1 tab (20 mg) in the evening Continue until your weight is at/below 202 lbs, then stop evening dose and continue only the morning dose of 40 mg once daily.            Review of Systems     A 12 point comprehensive review of systems was negative except as noted above in HPI.    PHYSICAL EXAMINATION       Vitals      Vitals: /78   Pulse 62   Temp 97.8  F (36.6  C) (Temporal)   Resp 26   Wt 95.3 kg (210 lb)   SpO2 92%   BMI= There is no height or weight on file to calculate BMI.      Examination     General Appearance:  Alert, cooperative, no distress  Head:    Normocephalic, without obvious abnormality, atraumatic  EENT:  PERRL, conjunctiva/corneas clear, EOM's intact.   Neck:   Supple, symmetrical, trachea midline, no adenopathy; no NVE  Back:  Symmetric, no curvature, no CVA tenderness  Chest/Lungs: decreased air entry. Mild rales, rhonchi. respirations unlabored, No tenderness or deformity. No abdominal breathing or use of accessory muscles.   Heart:    Regular rate and rhythm, S1 and S2 normal, no murmur, rub   or gallop  Abdomen: Soft, non-tender, bowel sounds active all four quadrants, not peritoneal on palpation. Not distended  Extremities:  Bilateral leg swelling   Skin:  Skin color, texture, turgor normal, no rashes or lesion  Neurologic:  Awake and alert, No lateralizing or localizing signs           Pertinent Lab     Results for orders placed or performed during the hospital encounter of 04/22/22   Lactic acid whole blood   Result Value Ref Range    Lactic Acid 1.5 0.7 - 2.0 mmol/L   Result Value Ref Range    Troponin I 0.08 0.00 - 0.29 ng/mL   CBC (+ platelets, no diff)   Result Value Ref Range    WBC Count 15.7 (H) 4.0 - 11.0 10e3/uL    RBC Count 3.36 (L) 3.80 - 5.20 10e6/uL    Hemoglobin 9.3 (L) 11.7 - 15.7 g/dL     Hematocrit 28.6 (L) 35.0 - 47.0 %    MCV 85 78 - 100 fL    MCH 27.7 26.5 - 33.0 pg    MCHC 32.5 31.5 - 36.5 g/dL    RDW 16.2 (H) 10.0 - 15.0 %    Platelet Count 223 150 - 450 10e3/uL   Result Value Ref Range     () 30 - 190 U/L   B-Type Natriuretic Peptide ([facility] Only)   Result Value Ref Range     (H) 0 - 167 pg/mL   ECG 12-LEAD WITH MUSE ([facility])   Result Value Ref Range    Systolic Blood Pressure  mmHg    Diastolic Blood Pressure  mmHg    Ventricular Rate 53 BPM    Atrial Rate 53 BPM    NJ Interval 160 ms    QRS Duration 90 ms     ms    QTc 651 ms    P Axis 69 degrees    R AXIS 47 degrees    T Axis 59 degrees    Interpretation ECG       Sinus bradycardia  Nonspecific T wave abnormality  Abnormal ECG  No previous ECGs available  Confirmed by SEE ED PROVIDER NOTE FOR, ECG INTERPRETATION ([id number]),  ESTEPHANIE CALLE ([id number]) on 4/22/2022 7:12:28 PM             Pertinent Radiology

## 2022-04-23 NOTE — PROGRESS NOTES
RESPIRATORY CARE NOTE     Patient Name: Yuki Mock  Today's Date: 4/23/2022       Pt continues to receive albuterol neb. BS are diminished with faint wheeze. Pt is on 5 lpm of oxygen via NC, SpO2 is 94%. Pt took 4 breaths of the nebulizer and stopped it and stated she wanted a inhaler. RT ordered PRN inhaler.RT will continue to monitor and assess.     Chula Villatoro, RT

## 2022-04-23 NOTE — ED PROVIDER NOTES
EMERGENCY DEPARTMENT ENCOUNTER      NAME: Yuki Mock  YOB: 1932  MRN: [id number]      FINAL IMPRESSION  1. Pneumonia of both lower lobes due to infectious organism    2. Dyspnea, unspecified type    3. Fall, initial encounter        MEDICAL DECISION MAKING   Pertinent Labs & Imaging studies reviewed. (See chart for details)    Yuki Mock is an 89-year-old female presents for evaluation of a cough, shortness of breath, and generalized fatigue.  Yesterday, she fell and hit the posterior aspect of her head when she was letting her dog out.  She sustained a laceration to the area and reports she was not able to get up due to weakness.  Records reviewed.  Patient was initially seen at urgency room for evaluation of the symptoms.  While there, she had a thorough work-up including labs, chest x-ray, CT of head, EKG.  Scalp laceration was closed with sutures.  Patient was also given antibiotics for pneumonia identified on chest x-ray.  X-ray also revealed some masslike opacities in the upper lungs and radiologist recommended CT for further characterization.  CT of head was negative.  Labs were notable for elevated WBC, CK, and troponin.  Provider at urgency room recommended admission but given lab abnormalities and masses identified on CT scan, she was sent here for evaluation first.    At time my assessment, patient corroborated above story.  She reports that her symptoms began a couple days ago with nasal congestion and earlier today, she developed a cough and dyspnea.  She reports that last night, she had a fall while letting out her dog but is not entirely sure why.  She denies associated loss of consciousness and did not experience any preceding chest pain, focal neuro symptoms, palpitations to suggest cardiopulmonary or neurologic process.  I did consider PE but feel this less likely in the absence of tachycardia, tachypnea, chest pain, or hypoxia.  Additionally, patient does not  have clear risk factors.  She states that she was not able to get up, as she never can after she falls.  She endorses diarrhea but states that this is relatively chronic given her gluten intolerance.  She denies urinary symptoms, although notes that she has been incontinent for quite some time.  She did have a negative UA at urgency room.  Patient denies chest pain, abdominal pain, or leg swelling.  She does not have a history of COPD or asthma.  She denies recent travel or sick contacts.    Discussed options for work-up and management with patient and her in number.  We have agreed on plan to recheck labs and EKG, pursue CT scan of chest, and continue with plan for admission.  We will give Tylenol for aches and pains related to fall yesterday.  At this point, I see no indication for further advanced imaging around the fall itself, as patient has been able to ambulate and has no complaints of pain in her extremities, neck, or back.    Labs notable for leukocytosis with WBC of 15.7, likely related to pneumonia identified on chest x-ray at clinic.  Hemoglobin 9.3.  BMP with creatinine 1.96 and GFR of 24.  Patient does have a history of kidney disease and this does appear stable.  No other significant electrolyte derangement or acidosis.  Troponin negative here and EKG without ischemic changes.  I have lower suspicion for ACS/ischemia.  Lactate within normal limit at 1.5.  BNP elevated at 940 but without recent for comparison.    I discussed the case with Dr. Waktins who was aware that the patient was coming from clinic.  Given abnormalities identified on chest x-ray and possible cancerous process, we shared some concern that the episode last night with associated head trauma could potentially be related to a PE.  Alternatively, these could be separate processes.  Dr. Watkins would like to proceed with a CT PE study so we will plan to order this and have hospital medicine team follow-up.    I discussed the case and  Dr. Watkins and my plan for work-up with Dr. Grey after he reviewed patient's chart and came on for his shift.  We reviewed laboratory results and agreed that patient does have fairly poor kidney function and if at all possible, may benefit from alternative work-up of abnormality seen on chest x-ray and also to rule out PE.  I attempted to cancel the CT scan but it had unfortunately already been completed and patient was on her way up to the floor.      ED COURSE  7:07 PM I met with the patient, obtained history, performed an initial exam, and discussed options and plan for diagnostics and treatment here in the ED. PPE worn: N95, eye protection, gloves.  8:12 PM Checked in on and updated patient who is more wheezy after going to the bathroom.   8:44 PM Spoke with the hospitalist, Dr. Watkins.    9:54 PM Spoke with the hospitalist, Dr. Grey, who would like to cancel the CT scan.     10:00 PM Spoke with CT tech who reported that CT had already been done and patient was on her way up to the floor.       MEDICATIONS GIVEN IN THE ED  Medications   acetaminophen (TYLENOL) tablet 650 mg (650 mg Oral Given 4/22/22 1942)   ipratropium - albuterol 0.5 mg/2.5 mg/3 mL (DUONEB) neb solution 3 mL (3 mLs Nebulization Given 4/22/22 2027)       NEW PRESCRIPTIONS STARTED AT TODAY'S VISIT  New Prescriptions    No medications on file     =================================================================    Chief Complaint   Patient presents with     Cough       HPI:    Patient information was obtained from: Patient    Use of : N/A    Yuki Mock is a 89 year old female who presents for cough.     Per chart review, patient was seen in the [facility] Room earlier today after a fall while letting her dog out at 2:00 AM. Exact mechanism of injury unclear. She denied LOC. Patient was acting normally per daughter. 1.5 inch laceration to back top of head, bleeding stopped. Laceration repaired at [facility] Room. She  did have a negative Covid test at this time. Troponin was 0.128. Imaging studies below.    CXR showed: Mild cardiomegaly. There are patchy somewhat masslike opacities involving both upper lobes as well as mild diffuse interstitial prominence. No definite pleural effusion. No fractures identified.    CT Head Brain WO  IMPRESSION:  1. No acute intracranial abnormality.  2. Mild to moderate age-related changes.  3. Soft tissue swelling right superior parietal scalp with likely soft tissue laceration. Underlying calvarium intact.      Patient reports that a couple days ago, she started feeling nasal congestion. Earlier today, she developed a cough with shortness of breath. Last night, patient had a fall while letting her dog out but is not entirely sure how. She denies associated loss of consciousness and did not experience any preceding chest pain, focal neuro symptoms, palpitations.  She states that she was not able to get up, as she never can after she falls.  She endorses diarrhea but states that this is relatively chronic given her gluten intolerance.      Patient has a pertinent medical history of acute STEMI, CHF, KAL[place], CKD stage 4, anemia, diverticulitis, fibromyalgia, hyperlipidemia, hypertension, and breast cancer. Denies a history of COPD or asthma. Denies recent travel or sick contacts. She denies urinary symptoms, although notes that she has been incontinent for quite some time. Patient denies chest pain, abdominal pain, leg swelling, or any other complaints at this time.     RELEVANT HISTORY, MEDICATIONS, & ALLERGIES   Past medical history, surgical history, family history, medications, and allergies reviewed and pertinent noted in HPI. See end of note for comprehensive list.    REVIEW OF SYSTEMS:  Review of Systems   HENT: Positive for congestion (nasal).    Respiratory: Positive for cough and shortness of breath.    Cardiovascular: Negative for chest pain and leg swelling.   Gastrointestinal: Positive for  diarrhea (chronic). Negative for abdominal pain.   Genitourinary: Negative for dysuria and hematuria.        Positive for bladder incontinence (baseline).   All other systems reviewed and are negative.      PHYSICAL EXAM:    Vitals: /78   Pulse 62   Temp 97.8  F (36.6  C) (Temporal)   Resp 26   Wt 95.3 kg (210 lb)   SpO2 92%    General: Appears fatigued but in no acute distress. Alert and interactive, comfortable appearing.  HENT: Laceration to parietal scalp with staples in place. Oropharynx without erythema or exudates. MMM.   Eyes: Pupils mid-sized and equally reactive.   Neck: Full AROM.   Cardiovascular: Regular rate and rhythm. Peripheral pulses 2+ bilaterally.  Chest/Pulmonary: Slightly increased work of breathing. Lung sounds clear and equal throughout, no wheezes or crackles. No chest wall tenderness or deformities.  Abdomen: Soft, nondistended. Nontender without guarding or rebound.  Back/Spine: No CVA or midline tenderness.  Extremities: Normal ROM of all major joints. No lower extremity edema or tenderness.   Skin: Warm and dry. Normal skin color.   Neuro: Speech clear. CNs grossly intact. Moves all extremities appropriately. Strength and sensation grossly intact to all extremities.   Psych: Normal affect/mood, cooperative, memory appropriate.     LAB  Labs Ordered and Resulted from Time of ED Arrival to Time of ED Departure   CBC WITH PLATELETS - Abnormal       Result Value    WBC Count 15.7 (*)     RBC Count 3.36 (*)     Hemoglobin 9.3 (*)     Hematocrit 28.6 (*)     MCV 85      MCH 27.7      MCHC 32.5      RDW 16.2 (*)     Platelet Count 223     CK TOTAL - Abnormal     (*)    B-TYPE NATRIURETIC PEPTIDE ([facility] ONLY) - Abnormal     (*)    LACTIC ACID WHOLE BLOOD - Normal    Lactic Acid 1.5     TROPONIN I - Normal    Troponin I 0.08     BASIC METABOLIC PANEL   MAGNESIUM       RADIOLOGY  CT Chest Pulmonary Embolism w Contrast    (Results Pending)       EKG  Performed at:  1810  Impression: Sinus bradycardia.  Flat ST segments throughout.  No clear ischemic changes.  Difficult to measure QTC but other intervals within normal limits.  No previous for comparison.  Rate: 53  Rhythm: Sinus  QRS Interval: 90  QTc Interval: 651  Comparison: None    All laboratory and imaging results and EKG's were personally reviewed and interpreted by myself prior to disposition decision.       Comprehensive outline of EPIC chart Hx  PAST MEDICAL HISTORY    History reviewed. No pertinent past medical history.  History reviewed. No pertinent surgical history.    CURRENT MEDICATIONS    No current outpatient medications    ALLERGIES    Allergies   Allergen Reactions     Codeine      Gluten Meal      Other reaction(s): *Unknown     Isoniazid      Other reaction(s): Hepatic Dysfunction     Lisinopril Cough     Oxycodone Dizziness and Nausea     Pravastatin Muscle Pain (Myalgia)     Simvastatin Muscle Pain (Myalgia)     Terazosin Dizziness and Nausea     Blood pressure went up very high.     Tramadol Dizziness and Nausea       FAMILY HISTORY    History reviewed. No pertinent family history.    SOCIAL HISTORY      Nonsmoker        I, Gui Fleming, am serving as a scribe to document services personally performed by Dr. Josee Emmanuel based on my observation and the provider's statements to me. I, Josee Emmanuel MD attest that Gui Fleming is acting in a scribe capacity, has observed my performance of the services and has documented them in accordance with my direction.    Josee Emmanuel M.D.  Emergency Medicine  [facility] EMERGENCY ROOM  [address] [id number] [phone]  Dept: [phone]     Josee Emmanuel MD  04/23/22 0256

## 2022-04-24 ENCOUNTER — APPOINTMENT (OUTPATIENT)
Dept: OCCUPATIONAL THERAPY | Facility: CLINIC | Age: 87
DRG: 193 | End: 2022-04-24
Attending: INTERNAL MEDICINE
Payer: COMMERCIAL

## 2022-04-24 ENCOUNTER — APPOINTMENT (OUTPATIENT)
Dept: PHYSICAL THERAPY | Facility: CLINIC | Age: 87
DRG: 193 | End: 2022-04-24
Attending: INTERNAL MEDICINE
Payer: COMMERCIAL

## 2022-04-24 LAB
ANION GAP SERPL CALCULATED.3IONS-SCNC: 14 MMOL/L (ref 5–18)
BUN SERPL-MCNC: 35 MG/DL (ref 8–28)
CALCIUM SERPL-MCNC: 8.8 MG/DL (ref 8.5–10.5)
CHLORIDE BLD-SCNC: 104 MMOL/L (ref 98–107)
CK SERPL-CCNC: 176 U/L (ref 30–190)
CO2 SERPL-SCNC: 22 MMOL/L (ref 22–31)
CREAT SERPL-MCNC: 2.04 MG/DL (ref 0.6–1.1)
ERYTHROCYTE [DISTWIDTH] IN BLOOD BY AUTOMATED COUNT: 16.1 % (ref 10–15)
GFR SERPL CREATININE-BSD FRML MDRD: 23 ML/MIN/1.73M2
GLUCOSE BLD-MCNC: 105 MG/DL (ref 70–125)
HCT VFR BLD AUTO: 27.2 % (ref 35–47)
HGB BLD-MCNC: 8.7 G/DL (ref 11.7–15.7)
MCH RBC QN AUTO: 27.2 PG (ref 26.5–33)
MCHC RBC AUTO-ENTMCNC: 32 G/DL (ref 31.5–36.5)
MCV RBC AUTO: 85 FL (ref 78–100)
PLATELET # BLD AUTO: 215 10E3/UL (ref 150–450)
POTASSIUM BLD-SCNC: 3.5 MMOL/L (ref 3.5–5)
RBC # BLD AUTO: 3.2 10E6/UL (ref 3.8–5.2)
SODIUM SERPL-SCNC: 140 MMOL/L (ref 136–145)
WBC # BLD AUTO: 14.6 10E3/UL (ref 4–11)

## 2022-04-24 PROCEDURE — 36415 COLL VENOUS BLD VENIPUNCTURE: CPT | Performed by: INTERNAL MEDICINE

## 2022-04-24 PROCEDURE — 97162 PT EVAL MOD COMPLEX 30 MIN: CPT | Mod: GP

## 2022-04-24 PROCEDURE — 250N000011 HC RX IP 250 OP 636: Performed by: INTERNAL MEDICINE

## 2022-04-24 PROCEDURE — 97535 SELF CARE MNGMENT TRAINING: CPT | Mod: GO

## 2022-04-24 PROCEDURE — 82550 ASSAY OF CK (CPK): CPT | Performed by: INTERNAL MEDICINE

## 2022-04-24 PROCEDURE — 250N000013 HC RX MED GY IP 250 OP 250 PS 637: Performed by: INTERNAL MEDICINE

## 2022-04-24 PROCEDURE — 80048 BASIC METABOLIC PNL TOTAL CA: CPT | Performed by: INTERNAL MEDICINE

## 2022-04-24 PROCEDURE — 97166 OT EVAL MOD COMPLEX 45 MIN: CPT | Mod: GO

## 2022-04-24 PROCEDURE — 120N000001 HC R&B MED SURG/OB

## 2022-04-24 PROCEDURE — 99232 SBSQ HOSP IP/OBS MODERATE 35: CPT | Performed by: INTERNAL MEDICINE

## 2022-04-24 PROCEDURE — 97116 GAIT TRAINING THERAPY: CPT | Mod: GP

## 2022-04-24 PROCEDURE — 85014 HEMATOCRIT: CPT | Performed by: INTERNAL MEDICINE

## 2022-04-24 RX ORDER — FAMOTIDINE 20 MG/1
20 TABLET, FILM COATED ORAL
Status: DISCONTINUED | OUTPATIENT
Start: 2022-04-26 | End: 2022-04-28 | Stop reason: HOSPADM

## 2022-04-24 RX ORDER — METHOCARBAMOL 500 MG/1
500 TABLET, FILM COATED ORAL ONCE
Status: COMPLETED | OUTPATIENT
Start: 2022-04-24 | End: 2022-04-24

## 2022-04-24 RX ORDER — CYCLOBENZAPRINE HCL 5 MG
5 TABLET ORAL EVERY 8 HOURS PRN
Status: COMPLETED | OUTPATIENT
Start: 2022-04-24 | End: 2022-04-25

## 2022-04-24 RX ADMIN — CARVEDILOL 25 MG: 25 TABLET, FILM COATED ORAL at 08:48

## 2022-04-24 RX ADMIN — AMLODIPINE BESYLATE 5 MG: 5 TABLET ORAL at 08:48

## 2022-04-24 RX ADMIN — HEPARIN SODIUM 5000 UNITS: 5000 INJECTION, SOLUTION INTRAVENOUS; SUBCUTANEOUS at 21:30

## 2022-04-24 RX ADMIN — HEPARIN SODIUM 5000 UNITS: 5000 INJECTION, SOLUTION INTRAVENOUS; SUBCUTANEOUS at 11:06

## 2022-04-24 RX ADMIN — TORSEMIDE 40 MG: 20 TABLET ORAL at 08:47

## 2022-04-24 RX ADMIN — WHITE PETROLATUM: 1.75 OINTMENT TOPICAL at 09:13

## 2022-04-24 RX ADMIN — ALBUTEROL SULFATE 2 PUFF: 90 AEROSOL, METERED RESPIRATORY (INHALATION) at 05:31

## 2022-04-24 RX ADMIN — CARVEDILOL 25 MG: 25 TABLET, FILM COATED ORAL at 20:22

## 2022-04-24 RX ADMIN — CEFTRIAXONE SODIUM 2 G: 2 INJECTION, POWDER, FOR SOLUTION INTRAMUSCULAR; INTRAVENOUS at 21:30

## 2022-04-24 RX ADMIN — METHOCARBAMOL 500 MG: 500 TABLET ORAL at 22:05

## 2022-04-24 RX ADMIN — LEVOTHYROXINE SODIUM 50 MCG: 0.05 TABLET ORAL at 05:31

## 2022-04-24 RX ADMIN — ACETAMINOPHEN 1000 MG: 500 TABLET, FILM COATED ORAL at 08:47

## 2022-04-24 RX ADMIN — ACETAMINOPHEN 650 MG: 325 TABLET ORAL at 23:57

## 2022-04-24 RX ADMIN — ROSUVASTATIN CALCIUM 5 MG: 5 TABLET, FILM COATED ORAL at 20:22

## 2022-04-24 RX ADMIN — GABAPENTIN 100 MG: 100 CAPSULE ORAL at 08:48

## 2022-04-24 RX ADMIN — AMLODIPINE BESYLATE 5 MG: 5 TABLET ORAL at 20:22

## 2022-04-24 RX ADMIN — TORSEMIDE 20 MG: 20 TABLET ORAL at 17:25

## 2022-04-24 RX ADMIN — GABAPENTIN 100 MG: 100 CAPSULE ORAL at 20:22

## 2022-04-24 RX ADMIN — DOXYCYCLINE 100 MG: 100 INJECTION, POWDER, LYOPHILIZED, FOR SOLUTION INTRAVENOUS at 03:43

## 2022-04-24 RX ADMIN — ACETAMINOPHEN 1000 MG: 500 TABLET, FILM COATED ORAL at 14:01

## 2022-04-24 RX ADMIN — BENZONATATE 100 MG: 100 CAPSULE ORAL at 12:24

## 2022-04-24 RX ADMIN — CYCLOBENZAPRINE HYDROCHLORIDE 5 MG: 5 TABLET, FILM COATED ORAL at 20:31

## 2022-04-24 RX ADMIN — DOXYCYCLINE 100 MG: 100 INJECTION, POWDER, LYOPHILIZED, FOR SOLUTION INTRAVENOUS at 14:02

## 2022-04-24 RX ADMIN — ALBUTEROL SULFATE 2 PUFF: 90 AEROSOL, METERED RESPIRATORY (INHALATION) at 19:35

## 2022-04-24 RX ADMIN — FAMOTIDINE 20 MG: 20 TABLET ORAL at 08:48

## 2022-04-24 ASSESSMENT — ACTIVITIES OF DAILY LIVING (ADL)
ADLS_ACUITY_SCORE: 14
PREVIOUS_RESPONSIBILITIES: MEAL PREP;HOUSEKEEPING;LAUNDRY;MEDICATION MANAGEMENT;FINANCES
ADLS_ACUITY_SCORE: 14

## 2022-04-24 NOTE — PROGRESS NOTES
Occupational Therapy     04/24/22 0730   Quick Adds   Type of Visit Initial Occupational Therapy Evaluation   Living Environment   People in Home alone   Home Accessibility no concerns   Living Environment Comments one level [facility]   Self-Care   Usual Activity Tolerance good   Current Activity Tolerance moderate   Fall history within last six months yes   Number of times patient has fallen within last six months 1   Activity/Exercise/Self-Care Comment Ind ADLs at baseline   Instrumental Activities of Daily Living (IADL)   Previous Responsibilities meal prep;housekeeping;laundry;medication management;finances   IADL Comments Daughters assist with cooking/cleaning as needed; daughters assist with transportation   General Information   Onset of Illness/Injury or Date of Surgery 04/22/22   Referring Physician Sanjay Parson   Patient/Family Therapy Goal Statement (OT) home   Existing Precautions/Restrictions oxygen therapy device and L/min  (5L/min)   Cognitive Status Examination   Orientation Status orientation to person, place and time   Affect/Mental Status (Cognitive) WFL   Range of Motion Comprehensive   Comment, General Range of Motion BUE WFL   Strength Comprehensive (MMT)   Comment, General Manual Muscle Testing (MMT) Assessment generalized weakness   Transfers   Transfers sit-stand transfer   Sit-Stand Transfer   Sit-Stand [place] (Transfers) minimum assist (75% patient effort);verbal cues   Assistive Device (Sit-Stand Transfers) walker, 4-wheeled   Clinical Impression   Criteria for Skilled Therapeutic Interventions Met (OT) Yes, treatment indicated   OT Diagnosis Decreased ADL independence   OT Problem List-Impairments impacting ADL mobility;pain;strength   Assessment of Occupational Performance 3-5 Performance Deficits   Identified Performance Deficits bed mob, transfers, dressing, toileting   Planned Therapy Interventions (OT) ADL retraining;transfer training;progressive activity/exercise    Clinical Decision Making Complexity (OT) moderate complexity   Anticipated Equipment Needs Upon Discharge (OT) dressing equipment   Risk & Benefits of therapy have been explained care plan/treatment goals reviewed;patient   OT Discharge Planning   OT Discharge Recommendation (DC Rec) (S)  home with assist;home with home care occupational therapy;Transitional Care Facility  (24/7 assist)   OT Rationale for DC Rec Pt is not at baseline. Will need 24/7 assist for functional mob and ADLs; TCU if unable to get 24/7 care at home, however, pt declines TCU at this time.   Total Evaluation Time (Minutes)   Total Evaluation Time (Minutes) 5   OT Goals   Therapy Frequency (OT) Daily   OT Predicted Duration/Target Date for Goal Attainment 04/30/22   OT Goals Lower Body Dressing;Transfers;Hygiene/Grooming   OT: Hygiene/Grooming modified independent;while standing   OT: Lower Body Dressing Modified independent;using adaptive equipment   OT: Transfer Modified independent     Brandi Shankar OTR/ENRIQUETA 4/24/2022

## 2022-04-24 NOTE — PLAN OF CARE
Problem: Infection (Pneumonia)  Goal: Resolution of Infection Signs and Symptoms  Outcome: Ongoing, Progressing     Problem: Respiratory Compromise (Pneumonia)  Goal: Effective Oxygenation and Ventilation  Intervention: Promote Airway Secretion Clearance  Recent Flowsheet Documentation  Taken 4/24/2022 1200 by Vaughn Carlos RN  Cough And Deep Breathing: done with encouragement    Patient Aox4, pleasant, cooperative. Denies pain at this time. Vitally stable on 3L NC. SBA w/w to bathroom. On tele.

## 2022-04-24 NOTE — PROVIDER NOTIFICATION
"Provider notified @ 2216: \"Pt triggering sepsis protocol. Protocol ran. Elevated temp of 102.4 after taken acetaminophen 1 hr ago. Please advise.\"    Provider ordered: Collect 2 sets of Blood culture and repeat tylenol.     Only given 325 mg d/t max overdose for 24 hr period.     "

## 2022-04-24 NOTE — PLAN OF CARE
"  Problem: Fluid Imbalance (Pneumonia)  Goal: Fluid Balance  Outcome: Ongoing, Progressing   Goal Outcome Evaluation:             Still needing oxygen at 3 liters via nasal canula to keep sats above 90%. Patient states no use of oxygen at home. Attempted to wean off oxygen bu saturations dropped to 84% on room air. Still on IV abx .  Vital signs:  Temp: 99.2  F (37.3  C) Temp src: Oral BP: 127/62 Pulse: 75   Resp: 20 SpO2: 93 % O2 Device: Nasal cannula Oxygen Delivery: 3 LPM Height: 165.1 cm (5' 5\") Weight: 98.2 kg (216 lb 9.6 oz)  Estimated body mass index is 36.04 kg/m  as calculated from the following:    Height as of this encounter: 1.651 m (5' 5\").    Weight as of this encounter: 98.2 kg (216 lb 9.6 oz).     Jeffrey Blake RN  4/24/2022  11:02 AM           "

## 2022-04-24 NOTE — PROGRESS NOTES
04/24/22 0830   Quick Adds   Type of Visit Initial PT Evaluation   Living Environment   People in Home alone   Current Living Arrangements house  (one level [facility])   Home Accessibility   (ramped entrance)   Self-Care   Usual Activity Tolerance good   Current Activity Tolerance moderate   Equipment Currently Used at Home walker, rolling  (4WW at all times)   Number of times patient has fallen within last six months 1  (while attempting to attach her dog to a chain)   General Information   Onset of Illness/Injury or Date of Surgery 04/22/22   Patient/Family Therapy Goals Statement (PT) go home   Pertinent History of Current Problem (include personal factors and/or comorbidities that impact the POC) on the floor for extended time   Existing Precautions/Restrictions fall   Strength (Manual Muscle Testing)   Strength (Manual Muscle Testing) Deficits observed during functional mobility  (sit<>stand)   Transfers   Transfers sit-stand transfer   Impairments Contributing to Impaired Transfers decreased strength   Sit-Stand Transfer   Sit-Stand [place] (Transfers) minimum assist (75% patient effort);verbal cues   Assistive Device (Sit-Stand Transfers) walker, 4-wheeled   Comment, (Sit-Stand Transfer) accustomed to pulling self up to stand   Gait/Stairs (Locomotion)   [place] Level (Gait) contact guard   Assistive Device (Gait) walker, 4-wheeled   Distance in Feet (Required for LE Total Joints) 100   Deviations/Abnormal Patterns (Gait) sharmaine decreased;weight shifting decreased;gait speed decreased   Clinical Impression   Criteria for Skilled Therapeutic Intervention Yes, treatment indicated   PT Diagnosis (PT) impaired functional mobility   Influenced by the following impairments gait, transfers   Functional limitations due to impairments weakness, balance impairment, dec endurance   Clinical Presentation (PT Evaluation Complexity) Evolving/Changing   Clinical Presentation Rationale pt presents as medically  diagnosed   Clinical Decision Making (Complexity) moderate complexity   Planned Therapy Interventions (PT) gait training;home exercise program;patient/family education;strengthening;transfer training;home program guidelines;balance training   Anticipated Equipment Needs at Discharge (PT) walker, rolling  (4WW)   Risk & Benefits of therapy have been explained evaluation/treatment results reviewed;care plan/treatment goals reviewed;patient   PT Discharge Planning   PT Discharge Recommendation (DC Rec) home with assist;home with home care physical therapy;Transitional Care Facility  (pt declines TCU)   PT Rationale for DC Rec weakness, high fall risk, needs assist for safe mobility   Plan of Care Review   Plan of Care Reviewed With patient   Total Evaluation Time   Total Evaluation Time (Minutes) 15   Physical Therapy Goals   PT Frequency Daily   PT Predicted Duration/Target Date for Goal Attainment 04/28/22   PT Goals Transfers;Gait   PT: Transfers Modified independent;Sit to/from stand;Assistive device   PT: Gait Modified independent;Rolling walker;150 feet

## 2022-04-24 NOTE — PLAN OF CARE
Goal Outcome Evaluation:    Plan of Care Reviewed With: patient     Overall Patient Progress: improving    Outcome Evaluation: Pt alert and oriented. Afrebile. O2 sats maintained above 90% with CPAP with 5L for most of the night. SOB with exersion, wheezing. PRN albuterol inhaler utilized. Pt declined medication for a cough this AM. IV doxycline continued. Pt able to walk with her walker A1. C/o thigh cramping/spasms this AM. Tried repostion and ambulation. Discharge pending improvement of symptoms.

## 2022-04-24 NOTE — PROGRESS NOTES
Offered breakfast but she refused only wants a cup of coffee.  Jeffrey Blake RN  4/24/2022  9:05 AM

## 2022-04-24 NOTE — PLAN OF CARE
Problem: Plan of Care - These are the overarching goals to be used throughout the patient stay.    Goal: Optimal Comfort and Wellbeing  Outcome: Ongoing, Progressing    Pt alert and oriented x 3-4. Neuro checks completed q 4 hr. Pt spiked a temp of 102.4. Currently on scheduled acetaminophen. MD notified; see provider notification note for more info. Temp improving with cold wash cloth and PRN acetaminophen. Up with assist 1. Incontinent of bladder. Tele- Normal sinus rhythm. On O2- 5 L/min NC; sat @ 93%. SOB reported, PRN albuterol given. IS encouraged and performed. Will continue to monitor and intervene as needed.

## 2022-04-24 NOTE — PROGRESS NOTES
[facility] Daily Progress Note    Assessment/Plan:  89-year-old female with history of heart failure preserved ejection fraction, chronic kidney disease stage IV and history of necrotizing crescentic microscopic polyangiitis, anemia of chronic disease, who presented after suffering a fall in her home with scalp laceration.  Patient was noted to have mild elevation in total CK possible mild rhabdomyolysis.  Diagnosed with bilateral community-acquired pneumonia.     Bilateral community-acquired pneumonia  Acute respiratory failure with hypoxia  Procalcitonin elevated at 2.74 on 4/22.   White blood cell count elevated at 14.6  Respiratory panel PCR negative.   Sputum culture mixed alana  COVID PCR and influenza screens negative  Continue ceftriaxone 2 g IV every 24 hours, doxycycline 100 mg every 12 hours  Albuterol neb every 6 hours as needed  RCAT  Incentive spirometry  Continue supplemental oxygen, continue taper as tolerated.   Will require repeat CT scan in 4 to 6 months to ensure resolution of pneumonia.     Possible acute on chronic diastolic congestive heart failure  Echocardiogram January 18, 2022, LVEF 71%, grade 1 left ventricular diastolic dysfunction.  No significant valvular disease.   and patient with chronic kidney disease.  Bilateral lung infiltrates likely consistent with multifocal pneumonia.  Small pleural effusion noted.  torsemide 60 mg daily     Mild elevation in total CK  Resolved.  Likely mild rhabdomyolysis from recent fall.  Total CK normalized.   Resume rosuvastatin 5 mg nightly     Fall with scalp laceration  Status post suture placement in the ED.  Fall precautions  Lower extremity ultrasound negative for DVT  PT OT assessment tomorrow.     Chronic anemia normocytic.  Anemia of chronic kidney disease  Hemoglobin 9.2 g/dL  Monitor CBC     Obstructive sleep apnea on CPAP  Continue home CPAP home settings.     Chronic kidney disease stage IV  History of necrotizing crescentic microscopic  polyangiitis  Follows with Dr. Deandre Vernon, associated nephrology  Baseline creatinine around 2.0.  Creatinine 1.9 currently, stable.  Monitor renal function panel daily     Hypothyroidism  Hyperlipidemia  Fibromyalgia  Celiac sprue  Paroxysmal atrial fibrillation  Continue levothyroxine 50 mcg daily.  Continue gabapentin 100 mg twice daily  Continue famotidine 20 mg daily  Continue carvedilol 25 mg twice daily  Continue amlodipine 5 mg twice     COVID-19 status  SARS-CoV-2 PCR negative.      Diet: Combination Diet Low Saturated Fat Na <2400mg Diet, No Caffeine Diet  DVT Prophylaxis:  Pneumatic Compression Devices  Code Status: Full Code    Active Problems:    Fall, initial encounter    Pneumonia of both lower lobes due to infectious organism    Dyspnea, unspecified type     LOS: 2 days     Barriers to discharge: Respiratory failure with hypoxia, bilateral community acquired pneumonia  Discharge Disposition: Pending PT OT assessment clinical improvement.  Patient will likely require increased services at home.      Subjective:  Patient with fever, Tmax 102.4 degrees Fahrenheit.  Temperature has normalized this morning.  Yuki is currently on supplemental oxygen at 3 L/min via nasal cannula.  This is improved from 5-1/2 L yesterday.  She reports cough of clear sputum.  Denies any chest pain or shortness of breath at rest currently.      acetaminophen  1,000 mg Oral TID     amLODIPine  5 mg Oral BID     carvedilol  25 mg Oral BID     cefTRIAXone  2 g Intravenous Q24H     doxycycline (VIBRAMYCIN) IV  100 mg Intravenous Q12H     famotidine  20 mg Oral Daily     gabapentin  100 mg Oral BID     heparin ANTICOAGULANT  5,000 Units Subcutaneous Q12H     levothyroxine  50 mcg Oral Daily     mineral oil-hydrophilic petrolatum   Topical Daily     [Held by provider] rosuvastatin  5 mg Oral At Bedtime     sodium chloride (PF)  3 mL Intracatheter Q8H     torsemide  20 mg Oral Daily     torsemide  40 mg Oral Daily        Objective:  Vital signs in last 24 hours:  Temp:  [98.2  F (36.8  C)-102.4  F (39.1  C)] 98.4  F (36.9  C)  Pulse:  [66-90] 79  Resp:  [18-26] 20  BP: (112-138)/() 125/58  SpO2:  [92 %-95 %] 95 %  Weight:   Weight:   @THISENCWEIGHTS(1)@  Weight change: 2.994 kg (6 lb 9.6 oz)  Body mass index is 36.04 kg/m .    Intake/Output last 3 shifts:  I/O last 3 completed shifts:  In: 320 [P.O.:320]  Out: 200 [Urine:200]  Intake/Output this shift:  No intake/output data recorded.    Review of Systems:   As per subjective, all others negative.    Physical Exam:    GENERAL:  Alert, appears comfortable, in no acute distress, appears stated age   HEAD:  Normocephalic, without obvious abnormality, atraumatic   LUNGS:    Diminished breath sounds at bases, no rhonchi, or wheezing, symmetric chest rise on inhalation, respirations unlabored   CHEST WALL:  No tenderness or deformity   HEART:  Regular rate and rhythm, S1 and S2 normal, no murmur, rub, or gallop    ABDOMEN:   Soft, non-tender, bowel sounds active all four quadrants, no masses, no organomegaly, no rebound or guarding   EXTREMITIES: Extremities normal, atraumatic, no cyanosis or edema    SKIN: Dry to touch, no exanthems in the visualized areas   NEURO: Alert, oriented x3, moves all four extremities freely, non-focal   PSYCH: Cooperative, behavior is appropriate      Imaging:  Personally Reviewed.  Results for orders placed or performed during the hospital encounter of 04/22/22   CT Chest Pulmonary Embolism w Contrast    Impression    IMPRESSION:  1.  There is no pulmonary embolus, aortic aneurysm or dissection.  2.  Bilateral multifocal pneumonia.  3.  Small right pleural effusion.  4.  Several mildly enlarged mediastinal and right hilar lymph nodes.  5.  Several small indeterminate lung nodules. Follow-up recommended.    Recommendations for one or multiple incidental lung nodules < 6mm :    Low risk patients: No routine follow-up.    High risk patients: Optional  follow-up CT at 12 months; if unchanged, no further follow-up.    *Low Risk: Minimal or absent history of smoking or other known risk factors.  *Nonsolid (ground glass) or partly solid nodules may require longer follow-up to exclude indolent adenocarcinoma.  *Recommendations based on Guidelines for the Management of Incidental Pulmonary Nodules Detected at CT: From the Fleischner Society 2017, Radiology 2017.     US Lower Extremity Venous Duplex Bilateral    Impression    IMPRESSION:  1.  No deep venous thrombosis in the bilateral lower extremities.       Lab Results:  Personally Reviewed.   Recent Labs   Lab 04/24/22  0531 04/22/22  2339 04/22/22  1947   WBC 14.6* 14.7* 15.7*   HGB 8.7* 9.2* 9.3*   HCT 27.2* 28.6* 28.6*    186 223     Recent Labs   Lab 04/24/22  0531 04/23/22  0345 04/22/22 2038    139 139   CO2 22 22 22   BUN 35* 33* 34*   ALBUMIN  --  2.9*  --    ALKPHOS  --  76  --    ALT  --  14  --    AST  --  29  --      No results for input(s): INR in the last 168 hours.    I reviewed all labs and imaging studies as of this date and I reviewed all current inpatient medications and updated them      Sanjay Parson DO, MS  [facility] Service  Internal Medicine

## 2022-04-24 NOTE — CONSULTS
Care Management Initial Consult    General Information  Assessment completed with: Patient, Children,    Type of CM/SW Visit: Initial Assessment    Primary Care Provider verified and updated as needed: Yes   Readmission within the last 30 days:           Advance Care Planning:            Communication Assessment  Patient's communication style: spoken language (English or Bilingual)    Hearing Difficulty or Deaf: yes   Wear Glasses or Blind: no    Cognitive  Cognitive/Neuro/Behavioral: WDL  Level of Consciousness: alert  Arousal Level: arouses to touch/gentle shaking  Orientation: oriented x 4  Mood/Behavior: calm, cooperative  Best Language: 0 - No aphasia  Speech: clear    Living Environment:   People in home: alone     Current living Arrangements: house      Able to return to prior arrangements:         Family/Social Support:  Care provided by:    Provides care for:                  Description of Support System:           Current Resources:   Patient receiving home care services:       Community Resources:    Equipment currently used at home: walker, rolling (4WW at all times)  Supplies currently used at home:      Employment/Financial:  Employment Status:          Financial Concerns:             Lifestyle & Psychosocial Needs:  Social Determinants of Health     Tobacco Use: Not on file   Alcohol Use: Not on file   Financial Resource Strain: Not on file   Food Insecurity: Not on file   Transportation Needs: Not on file   Physical Activity: Not on file   Stress: Not on file   Social Connections: Not on file   Intimate Partner Violence: Not on file   Depression: Not on file   Housing Stability: Not on file       Functional Status:  Prior to admission patient needed assistance:              Mental Health Status:          Chemical Dependency Status:                Values/Beliefs:  Spiritual, Cultural Beliefs, Yazdanism Practices, Values that affect care:                 Additional Information:  Initial assessment  completed with pt and daughter, pt lives in house alone.  Discussed recommendation for TCU and pt strongly declining.  Discussed home care services which pt is also declining at this time.  Pt's daughter supporting pt and stating that they do not feel home care is needed at this time.  Care Management will continue to follow regarding discharge plans.    YEMI Hassan

## 2022-04-25 ENCOUNTER — APPOINTMENT (OUTPATIENT)
Dept: CT IMAGING | Facility: CLINIC | Age: 87
DRG: 193 | End: 2022-04-25
Attending: INTERNAL MEDICINE
Payer: COMMERCIAL

## 2022-04-25 ENCOUNTER — APPOINTMENT (OUTPATIENT)
Dept: OCCUPATIONAL THERAPY | Facility: CLINIC | Age: 87
DRG: 193 | End: 2022-04-25
Payer: COMMERCIAL

## 2022-04-25 LAB
ALBUMIN SERPL-MCNC: 2.6 G/DL (ref 3.5–5)
ALBUMIN SERPL-MCNC: NORMAL G/DL
ALP SERPL-CCNC: 84 U/L (ref 45–120)
ALP SERPL-CCNC: NORMAL U/L
ALT SERPL W P-5'-P-CCNC: 16 U/L (ref 0–45)
ALT SERPL W P-5'-P-CCNC: NORMAL U/L
ANION GAP SERPL CALCULATED.3IONS-SCNC: 14 MMOL/L (ref 5–18)
ANION GAP SERPL CALCULATED.3IONS-SCNC: NORMAL MMOL/L
AST SERPL W P-5'-P-CCNC: 23 U/L (ref 0–40)
AST SERPL W P-5'-P-CCNC: NORMAL U/L
ATRIAL RATE - MUSE: 67 BPM
BACTERIA SPT CULT: NORMAL
BASE EXCESS BLDV CALC-SCNC: -0.7 MMOL/L
BASE EXCESS BLDV CALC-SCNC: NORMAL MMOL/L
BASOPHILS # BLD AUTO: 0.1 10E3/UL (ref 0–0.2)
BASOPHILS NFR BLD AUTO: 0 %
BILIRUB SERPL-MCNC: 0.4 MG/DL (ref 0–1)
BILIRUB SERPL-MCNC: NORMAL MG/DL
BNP SERPL-MCNC: 382 PG/ML (ref 0–167)
BUN SERPL-MCNC: 36 MG/DL (ref 8–28)
BUN SERPL-MCNC: NORMAL MG/DL
CALCIUM SERPL-MCNC: 8.7 MG/DL (ref 8.5–10.5)
CALCIUM SERPL-MCNC: NORMAL MG/DL
CHLORIDE BLD-SCNC: 101 MMOL/L (ref 98–107)
CHLORIDE BLD-SCNC: NORMAL MMOL/L
CO2 SERPL-SCNC: 21 MMOL/L (ref 22–31)
CO2 SERPL-SCNC: NORMAL MMOL/L
CREAT SERPL-MCNC: 2.23 MG/DL (ref 0.6–1.1)
CREAT SERPL-MCNC: NORMAL MG/DL
DIASTOLIC BLOOD PRESSURE - MUSE: NORMAL MMHG
ENTEROCOCCUS FAECALIS: NOT DETECTED
ENTEROCOCCUS FAECIUM: NOT DETECTED
EOSINOPHIL # BLD AUTO: 0.4 10E3/UL (ref 0–0.7)
EOSINOPHIL NFR BLD AUTO: 3 %
ERYTHROCYTE [DISTWIDTH] IN BLOOD BY AUTOMATED COUNT: 16 % (ref 10–15)
GFR SERPL CREATININE-BSD FRML MDRD: 20 ML/MIN/1.73M2
GFR SERPL CREATININE-BSD FRML MDRD: NORMAL ML/MIN/{1.73_M2}
GLUCOSE BLD-MCNC: 116 MG/DL (ref 70–125)
GLUCOSE BLD-MCNC: NORMAL MG/DL
GRAM STAIN RESULT: NORMAL
HCO3 BLDV-SCNC: 24 MMOL/L (ref 24–30)
HCO3 BLDV-SCNC: NORMAL MMOL/L
HCT VFR BLD AUTO: 24.1 % (ref 35–47)
HGB BLD-MCNC: 7.7 G/DL (ref 11.7–15.7)
IMM GRANULOCYTES # BLD: 0.2 10E3/UL
IMM GRANULOCYTES NFR BLD: 1 %
INTERPRETATION ECG - MUSE: NORMAL
LACTATE SERPL-SCNC: 0.6 MMOL/L (ref 0.7–2)
LACTATE SERPL-SCNC: NORMAL MMOL/L
LISTERIA SPECIES (DETECTED/NOT DETECTED): NOT DETECTED
LYMPHOCYTES # BLD AUTO: 2.6 10E3/UL (ref 0.8–5.3)
LYMPHOCYTES NFR BLD AUTO: 20 %
MAGNESIUM SERPL-MCNC: 1.8 MG/DL (ref 1.8–2.6)
MAGNESIUM SERPL-MCNC: NORMAL MG/DL
MCH RBC QN AUTO: 27 PG (ref 26.5–33)
MCHC RBC AUTO-ENTMCNC: 32 G/DL (ref 31.5–36.5)
MCV RBC AUTO: 85 FL (ref 78–100)
MONOCYTES # BLD AUTO: 1.4 10E3/UL (ref 0–1.3)
MONOCYTES NFR BLD AUTO: 11 %
NEUTROPHILS # BLD AUTO: 8.2 10E3/UL (ref 1.6–8.3)
NEUTROPHILS NFR BLD AUTO: 65 %
NRBC # BLD AUTO: 0 10E3/UL
NRBC BLD AUTO-RTO: 0 /100
OXYHGB MFR BLDV: 81 % (ref 70–75)
OXYHGB MFR BLDV: NORMAL %
P AXIS - MUSE: 57 DEGREES
PCO2 BLDV: 41 MM HG (ref 35–50)
PCO2 BLDV: NORMAL MM[HG]
PH BLDV: 7.38 [PH] (ref 7.35–7.45)
PH BLDV: NORMAL [PH]
PLATELET # BLD AUTO: 218 10E3/UL (ref 150–450)
PO2 BLDV: 44 MM HG (ref 25–47)
PO2 BLDV: NORMAL MM[HG]
POTASSIUM BLD-SCNC: 3.5 MMOL/L (ref 3.5–5)
POTASSIUM BLD-SCNC: NORMAL MMOL/L
PR INTERVAL - MUSE: 172 MS
PROCALCITONIN SERPL-MCNC: 2.08 NG/ML (ref 0–0.49)
PROT SERPL-MCNC: 6 G/DL (ref 6–8)
PROT SERPL-MCNC: NORMAL G/DL
QRS DURATION - MUSE: 80 MS
QT - MUSE: 450 MS
QTC - MUSE: 475 MS
R AXIS - MUSE: 54 DEGREES
RBC # BLD AUTO: 2.85 10E6/UL (ref 3.8–5.2)
SAO2 % BLDV: 82.7 % (ref 70–75)
SAO2 % BLDV: NORMAL %
SODIUM SERPL-SCNC: 136 MMOL/L (ref 136–145)
SODIUM SERPL-SCNC: NORMAL MMOL/L
STAPHYLOCOCCUS AUREUS: NOT DETECTED
STAPHYLOCOCCUS EPIDERMIDIS: DETECTED
STAPHYLOCOCCUS LUGDUNENSIS: NOT DETECTED
STREPTOCOCCUS AGALACTIAE: NOT DETECTED
STREPTOCOCCUS ANGINOSUS GROUP: NOT DETECTED
STREPTOCOCCUS PNEUMONIAE: NOT DETECTED
STREPTOCOCCUS PYOGENES: NOT DETECTED
STREPTOCOCCUS SPECIES: NOT DETECTED
SYSTOLIC BLOOD PRESSURE - MUSE: NORMAL MMHG
T AXIS - MUSE: 85 DEGREES
TROPONIN I SERPL-MCNC: 0.04 NG/ML (ref 0–0.29)
TROPONIN I SERPL-MCNC: NORMAL NG/ML
VENTRICULAR RATE- MUSE: 67 BPM
WBC # BLD AUTO: 12.8 10E3/UL (ref 4–11)

## 2022-04-25 PROCEDURE — 82805 BLOOD GASES W/O2 SATURATION: CPT | Performed by: INTERNAL MEDICINE

## 2022-04-25 PROCEDURE — 84484 ASSAY OF TROPONIN QUANT: CPT | Performed by: INTERNAL MEDICINE

## 2022-04-25 PROCEDURE — 36415 COLL VENOUS BLD VENIPUNCTURE: CPT | Performed by: INTERNAL MEDICINE

## 2022-04-25 PROCEDURE — 250N000011 HC RX IP 250 OP 636: Performed by: INTERNAL MEDICINE

## 2022-04-25 PROCEDURE — 83735 ASSAY OF MAGNESIUM: CPT | Performed by: INTERNAL MEDICINE

## 2022-04-25 PROCEDURE — 84145 PROCALCITONIN (PCT): CPT | Performed by: INTERNAL MEDICINE

## 2022-04-25 PROCEDURE — 80053 COMPREHEN METABOLIC PANEL: CPT | Performed by: INTERNAL MEDICINE

## 2022-04-25 PROCEDURE — 94640 AIRWAY INHALATION TREATMENT: CPT

## 2022-04-25 PROCEDURE — 83605 ASSAY OF LACTIC ACID: CPT | Performed by: INTERNAL MEDICINE

## 2022-04-25 PROCEDURE — 250N000013 HC RX MED GY IP 250 OP 250 PS 637: Performed by: INTERNAL MEDICINE

## 2022-04-25 PROCEDURE — 73700 CT LOWER EXTREMITY W/O DYE: CPT | Mod: 50

## 2022-04-25 PROCEDURE — 72131 CT LUMBAR SPINE W/O DYE: CPT

## 2022-04-25 PROCEDURE — 97535 SELF CARE MNGMENT TRAINING: CPT | Mod: GO

## 2022-04-25 PROCEDURE — 250N000009 HC RX 250: Performed by: INTERNAL MEDICINE

## 2022-04-25 PROCEDURE — 258N000003 HC RX IP 258 OP 636: Performed by: INTERNAL MEDICINE

## 2022-04-25 PROCEDURE — 120N000004 HC R&B MS OVERFLOW

## 2022-04-25 PROCEDURE — 83880 ASSAY OF NATRIURETIC PEPTIDE: CPT | Performed by: INTERNAL MEDICINE

## 2022-04-25 PROCEDURE — 94640 AIRWAY INHALATION TREATMENT: CPT | Mod: 76

## 2022-04-25 PROCEDURE — 72192 CT PELVIS W/O DYE: CPT

## 2022-04-25 PROCEDURE — 87040 BLOOD CULTURE FOR BACTERIA: CPT | Performed by: INTERNAL MEDICINE

## 2022-04-25 PROCEDURE — 99232 SBSQ HOSP IP/OBS MODERATE 35: CPT | Performed by: INTERNAL MEDICINE

## 2022-04-25 PROCEDURE — 999N000157 HC STATISTIC RCP TIME EA 10 MIN

## 2022-04-25 PROCEDURE — 85025 COMPLETE CBC W/AUTO DIFF WBC: CPT | Performed by: INTERNAL MEDICINE

## 2022-04-25 RX ORDER — LIDOCAINE 50 MG/G
OINTMENT TOPICAL EVERY 4 HOURS PRN
Status: DISCONTINUED | OUTPATIENT
Start: 2022-04-25 | End: 2022-04-28 | Stop reason: HOSPADM

## 2022-04-25 RX ORDER — METHOCARBAMOL 500 MG/1
500 TABLET, FILM COATED ORAL 3 TIMES DAILY PRN
Status: DISCONTINUED | OUTPATIENT
Start: 2022-04-25 | End: 2022-04-28 | Stop reason: HOSPADM

## 2022-04-25 RX ORDER — ALBUTEROL SULFATE 0.83 MG/ML
2.5 SOLUTION RESPIRATORY (INHALATION)
Status: DISCONTINUED | OUTPATIENT
Start: 2022-04-25 | End: 2022-04-26

## 2022-04-25 RX ORDER — ALBUTEROL SULFATE 0.83 MG/ML
2.5 SOLUTION RESPIRATORY (INHALATION)
Status: DISCONTINUED | OUTPATIENT
Start: 2022-04-25 | End: 2022-04-28 | Stop reason: HOSPADM

## 2022-04-25 RX ADMIN — GABAPENTIN 100 MG: 100 CAPSULE ORAL at 21:07

## 2022-04-25 RX ADMIN — ALBUTEROL SULFATE 2 PUFF: 90 AEROSOL, METERED RESPIRATORY (INHALATION) at 09:32

## 2022-04-25 RX ADMIN — CEFTRIAXONE SODIUM 2 G: 2 INJECTION, POWDER, FOR SOLUTION INTRAMUSCULAR; INTRAVENOUS at 21:07

## 2022-04-25 RX ADMIN — TORSEMIDE 40 MG: 20 TABLET ORAL at 10:06

## 2022-04-25 RX ADMIN — ACETAMINOPHEN 1000 MG: 500 TABLET, FILM COATED ORAL at 21:07

## 2022-04-25 RX ADMIN — ALBUTEROL SULFATE 2.5 MG: 2.5 SOLUTION RESPIRATORY (INHALATION) at 12:04

## 2022-04-25 RX ADMIN — HEPARIN SODIUM 5000 UNITS: 5000 INJECTION, SOLUTION INTRAVENOUS; SUBCUTANEOUS at 09:30

## 2022-04-25 RX ADMIN — VANCOMYCIN HYDROCHLORIDE 1500 MG: 5 INJECTION, POWDER, LYOPHILIZED, FOR SOLUTION INTRAVENOUS at 14:11

## 2022-04-25 RX ADMIN — CYCLOBENZAPRINE HYDROCHLORIDE 5 MG: 5 TABLET, FILM COATED ORAL at 05:04

## 2022-04-25 RX ADMIN — AMLODIPINE BESYLATE 5 MG: 5 TABLET ORAL at 09:17

## 2022-04-25 RX ADMIN — AMLODIPINE BESYLATE 5 MG: 5 TABLET ORAL at 21:07

## 2022-04-25 RX ADMIN — METHOCARBAMOL TABLETS 500 MG: 500 TABLET, COATED ORAL at 19:21

## 2022-04-25 RX ADMIN — GABAPENTIN 100 MG: 100 CAPSULE ORAL at 09:17

## 2022-04-25 RX ADMIN — WHITE PETROLATUM: 1.75 OINTMENT TOPICAL at 09:17

## 2022-04-25 RX ADMIN — METHOCARBAMOL TABLETS 500 MG: 500 TABLET, COATED ORAL at 09:17

## 2022-04-25 RX ADMIN — CARVEDILOL 25 MG: 25 TABLET, FILM COATED ORAL at 09:17

## 2022-04-25 RX ADMIN — ALBUTEROL SULFATE 2.5 MG: 2.5 SOLUTION RESPIRATORY (INHALATION) at 16:07

## 2022-04-25 RX ADMIN — TORSEMIDE 20 MG: 20 TABLET ORAL at 21:06

## 2022-04-25 RX ADMIN — CARVEDILOL 25 MG: 25 TABLET, FILM COATED ORAL at 21:06

## 2022-04-25 RX ADMIN — DOXYCYCLINE 100 MG: 100 INJECTION, POWDER, LYOPHILIZED, FOR SOLUTION INTRAVENOUS at 03:57

## 2022-04-25 RX ADMIN — HEPARIN SODIUM 5000 UNITS: 5000 INJECTION, SOLUTION INTRAVENOUS; SUBCUTANEOUS at 21:07

## 2022-04-25 RX ADMIN — ACETAMINOPHEN 1000 MG: 500 TABLET, FILM COATED ORAL at 14:11

## 2022-04-25 RX ADMIN — ACETAMINOPHEN 1000 MG: 500 TABLET, FILM COATED ORAL at 09:16

## 2022-04-25 RX ADMIN — LEVOTHYROXINE SODIUM 50 MCG: 0.05 TABLET ORAL at 05:04

## 2022-04-25 ASSESSMENT — ACTIVITIES OF DAILY LIVING (ADL)
ADLS_ACUITY_SCORE: 14
ADLS_ACUITY_SCORE: 15
ADLS_ACUITY_SCORE: 15
ADLS_ACUITY_SCORE: 14
ADLS_ACUITY_SCORE: 15
ADLS_ACUITY_SCORE: 15
ADLS_ACUITY_SCORE: 14
ADLS_ACUITY_SCORE: 15
ADLS_ACUITY_SCORE: 14
ADLS_ACUITY_SCORE: 15
ADLS_ACUITY_SCORE: 14

## 2022-04-25 NOTE — PROGRESS NOTES
This writer picked the patient up at around 0330.  Patient was alert and oriented x3, off on place at first assessment, but then at second assessment, patient was A&Ox4.    Patient continually trying to get out of bed due to upper leg pain.  Patient was offered to sit in the recliner, but she stated that she had sat in one of those before, and it was too uncomfortable.  Patient was administered Flexeril, but it didn't see to help at all.  Bed alarms remain on due to the patient not using her call light prior to getting out of bed. VSS.

## 2022-04-25 NOTE — PROVIDER NOTIFICATION
20:10  Pt complaining of Bilateral muscle spasms in thighs. 5/10 pain Notified Dr. Carney.     21:45  Flexeril given without relief. Pt reports pain is increasing 8/10 pain. Notified Dr. Carney    23:00  Robaxin given without relief. Pt reports spasms are spreading down to her feet now and pain is increasing 10/10 pain. Notified Dr. Carney. Dr. Carney asked to have on site hospitalist evaluate pt at bedside. Notified Dr. Grey.      Pulses BLE present. Swelling noted BLE +3/+2 R>L. Denies numbness.    Ti Tyson RN

## 2022-04-25 NOTE — PLAN OF CARE
Problem: Plan of Care - These are the overarching goals to be used throughout the patient stay.    Goal: Absence of Hospital-Acquired Illness or Injury  Intervention: Identify and Manage Fall Risk  Recent Flowsheet Documentation  Taken 4/25/2022 0900 by Adela Vera RN  Safety Promotion/Fall Prevention:    bed alarm on    nonskid shoes/slippers when out of bed    mobility aid in reach    lighting adjusted    increase visualization of patient    increased rounding and observation    fall prevention program maintained    clutter free environment maintained     Problem: Infection (Pneumonia)  Goal: Resolution of Infection Signs and Symptoms  Outcome: Ongoing, Progressing     Problem: Pain Acute  Goal: Acceptable Pain Control and Functional Ability  Outcome: Ongoing, Progressing   Complained of generalized pain and pain in bilateral lower extremity and left shoulder. Available Scheduled Tylenol and PRN Robaxin given. Patient reported improved in pain.   Patient disoriented to situation and place otherwise alert. VSS. LS diminished with expiratory wheezes. PRN albuterol inhaler given. IS use encouraged. On Tele NSR. Staples to laceration scalp site clean dry and intact. Fall precautions in place. Up with assist of one with walker. Incontinent of bladder. Family at bedside most of this morning. Will monitor and continue plan of care.

## 2022-04-25 NOTE — PROVIDER NOTIFICATION
"Pt triggered Sepsis Protocol due to elevated WBC. Dr. Grey notified    BP (!) 177/79 (BP Location: Right arm)   Pulse 76   Temp (!) 101  F (38.3  C) (Axillary)   Resp 22   Ht 1.651 m (5' 5\")   Wt 98.2 kg (216 lb 9.6 oz)   SpO2 93%   BMI 36.04 kg/m      Ti Tyson RN    "

## 2022-04-25 NOTE — PROGRESS NOTES
[facility] Daily Progress Note    Assessment/Plan:  89-year-old female with history of heart failure preserved ejection fraction, chronic kidney disease stage IV and history of necrotizing crescentic microscopic polyangiitis, anemia of chronic disease, who presented after suffering a fall in her home with scalp laceration.  Patient was noted to have mild elevation in total CK possible mild rhabdomyolysis.  Diagnosed with bilateral community-acquired pneumonia.  Increased hip pain overnight, and CT imaging reveals degenerative lumbar and hip disease.  Robaxin ordered with some relief.     Bilateral community-acquired pneumonia  Acute respiratory failure with hypoxia  Procalcitonin elevated at 2.74 on 4/22 down to 2.08 on 4/25.   White blood cell count improved 14.6 ->12.8  Respiratory panel PCR negative.   Sputum culture mixed alana  COVID PCR and influenza screens negative  Continue ceftriaxone 2 g IV every 24 hours, doxycycline 100 mg every 12 hours  Albuterol neb q6h.   RCAT  Incentive spirometry  Continue supplemental oxygen, continue taper as tolerated.   Will require repeat CT scan in 4 to 6 months to ensure resolution of pneumonia.     Possible acute on chronic diastolic congestive heart failure  Echocardiogram January 18, 2022, LVEF 71%, grade 1 left ventricular diastolic dysfunction.  No significant valvular disease.   and patient with chronic kidney disease, improved to 382 on 4/25.  Bilateral lung infiltrates likely consistent with multifocal pneumonia.    Small pleural effusion noted.  Continue torsemide 60 mg daily  Renal function panel tomorrow.      Fall with scalp laceration  Status post suture placement in the ED.  Fall precautions  Lower extremity ultrasound negative for DVT  Home with assistance, or TCU placement, care-management consultation.     Bilateral hip pain, muscle spasm.   Chronic left shoulder pain  Order Robaxin 500 mg po TID prn.   Heating therapy and topical lidocaine gel 5% q4h  prn.  CT hip on 4/25 degenerative hip disease, no fracture/dislocation.   Lumbar spine with multilevel degenerative changes with moderate to severe disease L3-S1.  Unlikely surgical correction an option.  Will recommend outpatient referral to non operative orthopedics provider if ongoing pain.     Chronic anemia normocytic.  Anemia of chronic kidney disease  Hemoglobin 9.2->7.7 g/dL  Monitor CBC, repeat in a.m.     Obstructive sleep apnea on CPAP  Continue home CPAP home settings.     Chronic kidney disease stage IV  History of necrotizing crescentic microscopic polyangiitis  Follows with Dr. Deandre Vernon, associated nephrology  Baseline creatinine around 2.0.  Creatinine 1.9 currently, stable.  Monitor renal function panel daily     Mild elevation in total CK  Resolved.  Likely mild rhabdomyolysis from recent fall.  Total CK normalized.   Resume rosuvastatin 5 mg nightly  Hypothyroidism  Hyperlipidemia  Fibromyalgia  Celiac sprue  Paroxysmal atrial fibrillation  Continue levothyroxine 50 mcg daily.  Continue gabapentin 100 mg twice daily  Continue famotidine 20 mg q48 hours renal dosage.  Continue carvedilol 25 mg twice daily  Continue amlodipine 5 mg twice     COVID-19 status  SARS-CoV-2 PCR negative.    Diet: Combination Diet Low Saturated Fat Na <2400mg Diet, No Caffeine Diet  DVT Prophylaxis:  Heparin SQ  Code Status: Full Code    Active Problems:    Fall, initial encounter    Pneumonia of both lower lobes due to infectious organism    Dyspnea, unspecified type     LOS: 3 days     Barriers to discharge: Yuki continues to have respiratory failure, CAP, continue respiratory cares.   Discharge Disposition: Home with home cares, versus TCU.    Subjective:  Yuki reports left shoulder pain with radiation to upper arm.  This is chronic pain for her.  It has been exacerbated by laying in bed and transfers.  Denies muscle weakness in  or arm.  Denies chest pain.  Had more cough overnight, and felt shortness  of breath.        acetaminophen  1,000 mg Oral TID     amLODIPine  5 mg Oral BID     carvedilol  25 mg Oral BID     cefTRIAXone  2 g Intravenous Q24H     doxycycline (VIBRAMYCIN) IV  100 mg Intravenous Q12H     [START ON 4/26/2022] famotidine  20 mg Oral Q48H     gabapentin  100 mg Oral BID     heparin ANTICOAGULANT  5,000 Units Subcutaneous Q12H     levothyroxine  50 mcg Oral Daily     mineral oil-hydrophilic petrolatum   Topical Daily     rosuvastatin  5 mg Oral At Bedtime     sodium chloride (PF)  3 mL Intracatheter Q8H     torsemide  20 mg Oral Daily     torsemide  40 mg Oral Daily       Objective:  Vital signs in last 24 hours:  Temp:  [97.5  F (36.4  C)-101  F (38.3  C)] 98.9  F (37.2  C)  Pulse:  [62-76] 73  Resp:  [20-26] 22  BP: (123-177)/(58-79) 173/72  SpO2:  [85 %-100 %] 100 %  Weight:   Weight:   @THISENCWEIGHTS(1)@  Weight change: 1.225 kg (2 lb 11.2 oz)  Body mass index is 36.49 kg/m .    Intake/Output last 3 shifts:  I/O last 3 completed shifts:  In: 960 [P.O.:960]  Out: 400 [Urine:400]  Intake/Output this shift:  No intake/output data recorded.    Review of Systems:   As per subjective, all others negative.    Physical Exam:    GENERAL:  Alert, appears comfortable, in no acute distress, appears stated age   HEAD:  Staples in place to scalp laceration.  No discharge, erythema noted.    NECK: Supple, symmetrical, trachea midline   BACK:   Symmetric, no curvature, ROM normal   LUNGS:   Diminished breath sounds, wheezing, symmetric chest rise on inhalation, respirations unlabored   CHEST WALL:  No tenderness or deformity   HEART:  Regular rate and rhythm, S1 and S2 normal, no murmur, rub, or gallop    ABDOMEN:   Soft, non-tender, bowel sounds active all four quadrants, no masses, no organomegaly, no rebound or guarding   EXTREMITIES: Extremities normal, atraumatic, no cyanosis or edema, 5/5  strength left hand.  Diminished abduction of left shoulder.    SKIN: Dry to touch, no exanthems in the  visualized areas   NEURO: Alert, oriented x3, moves all four extremities freely, non-focal   PSYCH: Cooperative, behavior is appropriate      Imaging:  Personally Reviewed.  Results for orders placed or performed during the hospital encounter of 04/22/22   CT Chest Pulmonary Embolism w Contrast    Impression    IMPRESSION:  1.  There is no pulmonary embolus, aortic aneurysm or dissection.  2.  Bilateral multifocal pneumonia.  3.  Small right pleural effusion.  4.  Several mildly enlarged mediastinal and right hilar lymph nodes.  5.  Several small indeterminate lung nodules. Follow-up recommended.    Recommendations for one or multiple incidental lung nodules < 6mm :    Low risk patients: No routine follow-up.    High risk patients: Optional follow-up CT at 12 months; if unchanged, no further follow-up.    *Low Risk: Minimal or absent history of smoking or other known risk factors.  *Nonsolid (ground glass) or partly solid nodules may require longer follow-up to exclude indolent adenocarcinoma.  *Recommendations based on Guidelines for the Management of Incidental Pulmonary Nodules Detected at CT: From the Fleischner Society 2017, Radiology 2017.     US Lower Extremity Venous Duplex Bilateral    Impression    IMPRESSION:  1.  No deep venous thrombosis in the bilateral lower extremities.   CT Lumbar Spine w/o Contrast    Impression    IMPRESSION:  1.  No acute fracture or subluxation.  2.  Advanced multilevel degenerative changes with moderate to severe central canal stenosis at L3-L4 and moderate at L4-L5 and L5-S1.  3.  High-grade foraminal narrowing on the left at L3-L4 and L4-L5.   CT Pelvis Bone wo Contrast    Impression    IMPRESSION:  1.  No definitive evidence for acute displaced fracture or malalignment. Degenerative changes at the symphysis pubis, both SI joints and throughout the lumbar spine.    2.  Subcutaneous edema overlying the pelvis and the proximal thighs.   CT Hip Bilateral w/o Contrast     Impression    IMPRESSION:  1.  Osteopenia. No definitive evidence for acute displaced fracture. If there remains persistent pelvic pain or clinical concern for fracture, consider noncontrast MRI which would be more sensitive for nondisplaced fractures or bone marrow edema.    2.  Degenerative changes at both hips, symphysis pubis and SI joints.    3.  No evidence for intramuscular hematoma. Minimal subcutaneous edema involving the pelvis and proximal thighs.         Lab Results:  Personally Reviewed.   Recent Labs   Lab 04/25/22 0107 04/24/22  0531 04/22/22  2339   WBC 12.8* 14.6* 14.7*   HGB 7.7* 8.7* 9.2*   HCT 24.1* 27.2* 28.6*    215 186     Recent Labs   Lab 04/25/22 0107 04/24/22  0531 04/23/22  0345    140 139   CO2 21* 22 22   BUN 36* 35* 33*   ALBUMIN 2.6*  --  2.9*   ALKPHOS 84  --  76   ALT 16  --  14   AST 23  --  29     No results for input(s): INR in the last 168 hours.    I reviewed all labs and imaging studies as of this date and I reviewed all current inpatient medications and updated them    Sanjay Parson DO, MS  [facility]  Internal Medicine

## 2022-04-25 NOTE — PHARMACY-VANCOMYCIN DOSING SERVICE
Pharmacy Vancomycin Initial Note  Date of Service 2022  Patient's  11/15/1932  89 year old, female    Indication: Healthcare-Associated Pneumonia    Current estimated CrCl = Estimated Creatinine Clearance: 20 mL/min (A) (based on SCr of 2.23 mg/dL (H)).    Creatinine for last 3 days  2022:  8:38 PM Creatinine 1.96 mg/dL  2022:  3:45 AM Creatinine 1.90 mg/dL  2022:  5:31 AM Creatinine 2.04 mg/dL  2022:  1:07 AM Creatinine 2.23 mg/dL    Recent Vancomycin Level(s) for last 3 days  No results found for requested labs within last 72 hours.      Vancomycin IV Administrations (past 72 hours)      No vancomycin orders with administrations in past 72 hours.                Nephrotoxins and other renal medications (From now, onward)    Start     Dose/Rate Route Frequency Ordered Stop    22 1600  vancomycin 750 mg in 0.9% NaCl 250 mL intermittent infusion 750 mg         750 mg  over 60 Minutes Intravenous EVERY 24 HOURS 22 1245      22 1300  vancomycin 1500 mg in 0.9% NaCl 250 ml intermittent infusion 1,500 mg         1,500 mg  over 90 Minutes Intravenous ONCE 22 1245      22 1800  torsemide (DEMADEX) tablet 20 mg         20 mg Oral DAILY 22 0900  torsemide (DEMADEX) tablet 40 mg         40 mg Oral DAILY 22            Contrast Orders - past 72 hours (72h ago, onward)    Start     Dose/Rate Route Frequency Stop    22 2200  iopamidol (ISOVUE-370) solution 100 mL         100 mL Intravenous ONCE 22          [employer] Prediction of Planned Initial Vancomycin Regimen  Loading dose: 1500mg  Regimen: 750 mg IV every 24 hours.  Start time: 12:44 on 2022  Exposure target: AUC24 (range)400-600 mg/L.hr   AUC24,ss: 514 mg/L.hr  Probability of AUC24 > 400: 77 %  Ctrough,ss: 18.4 mg/L  Probability of Ctrough,ss > 20: 41 %  Probability of nephrotoxicity (Lodise RUBIO ): 15 %        Plan:  1. Start vancomycin  Load with  vancomycin 1500mg then give 750mg q24hr  2. Vancomycin monitoring method: AUC  3. Vancomycin therapeutic monitoring goal: 400-600 mg*h/L  4. Pharmacy will check vancomycin levels as appropriate in 1-3 Days.    5. Serum creatinine levels will be ordered daily for the first week of therapy and at least twice weekly for subsequent weeks.      Mohsen Claudio RPH

## 2022-04-25 NOTE — PLAN OF CARE
"RN note for cares provided 1500 - 1930:    Problem: Respiratory Compromise (Pneumonia)  Goal: Effective Oxygenation and Ventilation  Outcome: Ongoing, Progressing  Intervention: Promote Airway Secretion Clearance  Recent Flowsheet Documentation  Taken 4/24/2022 1700 by Jolynn Lynn RN  Cough And Deep Breathing: done with encouragement  Intervention: Optimize Oxygenation and Ventilation  Recent Flowsheet Documentation  Taken 4/24/2022 1735 by Jolynn Lynn RN  Head of Bed (HOB) Positioning: (to eat dinner) HOB at 60-90 degrees  /58 (BP Location: Right arm)   Pulse 65   Temp 98.3  F (36.8  C) (Oral)   Resp 20   Ht 1.651 m (5' 5\")   Wt 98.2 kg (216 lb 9.6 oz)   SpO2 94%   BMI 36.04 kg/m    Pt weaned from 3LPM to 2LPM O2 via NC, tolerating well, sats >92% on 2LPM O2 currently. Reports \"some\" dyspnea on exertion, reports \"feeling a lot better today.\" Encouraged frequent IS use and cough/deep breathing exercises. Tele is NSR.    Problem: Infection (Pneumonia)  Goal: Resolution of Infection Signs and Symptoms  Outcome: Ongoing, Progressing  Continues IV ABX per regimen, tolerating well.  Blood cultures pending.                    "

## 2022-04-25 NOTE — PROGRESS NOTES
Nurse reported that pt c/o muscle spasm.    Plan: add Flexeril 5 mp po q8h prn for tonight and defer to MD in am for further management.    Addendum: pt reported muscle spasm even after Flexeril. Will try Robaxin po 500 mg x1.   Defer to MD In am for further management.

## 2022-04-25 NOTE — PLAN OF CARE
Problem: Plan of Care - These are the overarching goals to be used throughout the patient stay.    Goal: Optimal Comfort and Wellbeing  Outcome: Ongoing, Not Progressing   Goal Outcome Evaluation:             Pt AOx4. Needed O2 increased from 2L to 4L due to tachypnea and shortness of breath.  Having urinary frequency and dribbling when coughing. 1 assist w/ walker and gait belt to ambulate to bathroom.     Ti Tyson RN

## 2022-04-25 NOTE — PROVIDER NOTIFICATION
"Provider Notified per protocol: \"Critical Lab report, Positive Blood culture Gram Positive Cocci in Clusters from peripheral Blood cultured 4/22/2022.\"    Addendum :  Provider Notified: \"Lab call with critical result. Culture growing Staphylococcus epidermidis (Positive for methicillin-resistant Staphylococcus epidermidis (MRSE)) please advise.\"    Discussed with Dr. Parson. New order for repeat blood culture and  New Order for IV Vancomycin. Contact isolation precautions placed.    "

## 2022-04-25 NOTE — PROGRESS NOTES
I was called to see the patient    Has fever of 101, SOB, O2 sat is 80s.     Seated on side of bed. Coughing incessantly   Complained of Bilateral hip pain, back/leg pain, was admitted for a recent fall and also had scalp laceration s/p suture.     Assessments - Admitted for multifocal PNA - concerns for sepsis, developed fever despite antibiotics. We are also concerned with CHF given SOB and dropped O2 sat.     Plans  1. Repeat labs and culture, lactic acid, procal  2. CT of lumbar spine, pelvis and hip   3. Transfer to cardiac tele - currently in orthopedic floor     3AM --- No fracture. Procalcitonin elev but improved. +. Fever subsided. If she spike temps again -- change antibiotic to Zosyn.     IMPRESSION:  1.  No acute fracture or subluxation.  2.  Advanced multilevel degenerative changes with moderate to severe central canal stenosis at L3-L4 and moderate at L4-L5 and L5-S1.  High-grade foraminal narrowing on the left at L3-L4 and L4-L5.    4AM - Consider referral to spine service if pain remains poorly controlled.  No leg weakness, no bowel/bladder disturbances

## 2022-04-26 ENCOUNTER — APPOINTMENT (OUTPATIENT)
Dept: OCCUPATIONAL THERAPY | Facility: CLINIC | Age: 87
DRG: 193 | End: 2022-04-26
Payer: COMMERCIAL

## 2022-04-26 LAB
ALBUMIN SERPL-MCNC: 2.4 G/DL (ref 3.5–5)
ANION GAP SERPL CALCULATED.3IONS-SCNC: 13 MMOL/L (ref 5–18)
BUN SERPL-MCNC: 34 MG/DL (ref 8–28)
CALCIUM SERPL-MCNC: 9.1 MG/DL (ref 8.5–10.5)
CHLORIDE BLD-SCNC: 107 MMOL/L (ref 98–107)
CO2 SERPL-SCNC: 23 MMOL/L (ref 22–31)
CREAT SERPL-MCNC: 1.96 MG/DL (ref 0.6–1.1)
ERYTHROCYTE [DISTWIDTH] IN BLOOD BY AUTOMATED COUNT: 16.2 % (ref 10–15)
GFR SERPL CREATININE-BSD FRML MDRD: 24 ML/MIN/1.73M2
GLUCOSE BLD-MCNC: 102 MG/DL (ref 70–125)
HCT VFR BLD AUTO: 23.2 % (ref 35–47)
HGB BLD-MCNC: 7.5 G/DL (ref 11.7–15.7)
HOLD SPECIMEN: NORMAL
MAGNESIUM SERPL-MCNC: 1.8 MG/DL (ref 1.8–2.6)
MCH RBC QN AUTO: 27.4 PG (ref 26.5–33)
MCHC RBC AUTO-ENTMCNC: 32.3 G/DL (ref 31.5–36.5)
MCV RBC AUTO: 85 FL (ref 78–100)
PHOSPHATE SERPL-MCNC: 3.3 MG/DL (ref 2.5–4.5)
PLATELET # BLD AUTO: 229 10E3/UL (ref 150–450)
POTASSIUM BLD-SCNC: 3.4 MMOL/L (ref 3.5–5)
POTASSIUM BLD-SCNC: 3.4 MMOL/L (ref 3.5–5)
POTASSIUM BLD-SCNC: 3.7 MMOL/L (ref 3.5–5)
RBC # BLD AUTO: 2.74 10E6/UL (ref 3.8–5.2)
SODIUM SERPL-SCNC: 143 MMOL/L (ref 136–145)
WBC # BLD AUTO: 12.6 10E3/UL (ref 4–11)

## 2022-04-26 PROCEDURE — 97535 SELF CARE MNGMENT TRAINING: CPT | Mod: GO

## 2022-04-26 PROCEDURE — 84132 ASSAY OF SERUM POTASSIUM: CPT | Performed by: INTERNAL MEDICINE

## 2022-04-26 PROCEDURE — 80069 RENAL FUNCTION PANEL: CPT | Performed by: INTERNAL MEDICINE

## 2022-04-26 PROCEDURE — 250N000011 HC RX IP 250 OP 636: Performed by: INTERNAL MEDICINE

## 2022-04-26 PROCEDURE — 94640 AIRWAY INHALATION TREATMENT: CPT

## 2022-04-26 PROCEDURE — 94640 AIRWAY INHALATION TREATMENT: CPT | Mod: 76

## 2022-04-26 PROCEDURE — 250N000013 HC RX MED GY IP 250 OP 250 PS 637: Performed by: INTERNAL MEDICINE

## 2022-04-26 PROCEDURE — 120N000004 HC R&B MS OVERFLOW

## 2022-04-26 PROCEDURE — 85014 HEMATOCRIT: CPT | Performed by: INTERNAL MEDICINE

## 2022-04-26 PROCEDURE — 258N000003 HC RX IP 258 OP 636: Performed by: INTERNAL MEDICINE

## 2022-04-26 PROCEDURE — 36415 COLL VENOUS BLD VENIPUNCTURE: CPT | Performed by: INTERNAL MEDICINE

## 2022-04-26 PROCEDURE — 999N000157 HC STATISTIC RCP TIME EA 10 MIN

## 2022-04-26 PROCEDURE — 83735 ASSAY OF MAGNESIUM: CPT | Performed by: INTERNAL MEDICINE

## 2022-04-26 PROCEDURE — 99232 SBSQ HOSP IP/OBS MODERATE 35: CPT | Performed by: INTERNAL MEDICINE

## 2022-04-26 PROCEDURE — 250N000009 HC RX 250: Performed by: INTERNAL MEDICINE

## 2022-04-26 RX ORDER — FUROSEMIDE 10 MG/ML
60 INJECTION INTRAMUSCULAR; INTRAVENOUS EVERY 12 HOURS
Status: DISCONTINUED | OUTPATIENT
Start: 2022-04-26 | End: 2022-04-27

## 2022-04-26 RX ORDER — CLONAZEPAM 0.5 MG/1
0.25 TABLET ORAL EVERY 8 HOURS PRN
Status: DISCONTINUED | OUTPATIENT
Start: 2022-04-26 | End: 2022-04-28 | Stop reason: HOSPADM

## 2022-04-26 RX ORDER — POTASSIUM CHLORIDE 1500 MG/1
20 TABLET, EXTENDED RELEASE ORAL ONCE
Status: COMPLETED | OUTPATIENT
Start: 2022-04-26 | End: 2022-04-26

## 2022-04-26 RX ORDER — ALBUTEROL SULFATE 0.83 MG/ML
2.5 SOLUTION RESPIRATORY (INHALATION)
Status: DISCONTINUED | OUTPATIENT
Start: 2022-04-26 | End: 2022-04-27

## 2022-04-26 RX ADMIN — VANCOMYCIN HYDROCHLORIDE 750 MG: 5 INJECTION, POWDER, LYOPHILIZED, FOR SOLUTION INTRAVENOUS at 16:17

## 2022-04-26 RX ADMIN — LEVOTHYROXINE SODIUM 50 MCG: 0.05 TABLET ORAL at 06:02

## 2022-04-26 RX ADMIN — ROSUVASTATIN CALCIUM 5 MG: 5 TABLET, FILM COATED ORAL at 21:54

## 2022-04-26 RX ADMIN — METHOCARBAMOL TABLETS 500 MG: 500 TABLET, COATED ORAL at 09:04

## 2022-04-26 RX ADMIN — CARVEDILOL 25 MG: 25 TABLET, FILM COATED ORAL at 09:04

## 2022-04-26 RX ADMIN — TORSEMIDE 40 MG: 20 TABLET ORAL at 09:03

## 2022-04-26 RX ADMIN — HEPARIN SODIUM 5000 UNITS: 5000 INJECTION, SOLUTION INTRAVENOUS; SUBCUTANEOUS at 21:53

## 2022-04-26 RX ADMIN — GABAPENTIN 100 MG: 100 CAPSULE ORAL at 09:03

## 2022-04-26 RX ADMIN — ACETAMINOPHEN 1000 MG: 500 TABLET, FILM COATED ORAL at 09:03

## 2022-04-26 RX ADMIN — GABAPENTIN 100 MG: 100 CAPSULE ORAL at 21:54

## 2022-04-26 RX ADMIN — HEPARIN SODIUM 5000 UNITS: 5000 INJECTION, SOLUTION INTRAVENOUS; SUBCUTANEOUS at 12:05

## 2022-04-26 RX ADMIN — WHITE PETROLATUM: 1.75 OINTMENT TOPICAL at 09:05

## 2022-04-26 RX ADMIN — AMLODIPINE BESYLATE 5 MG: 5 TABLET ORAL at 09:04

## 2022-04-26 RX ADMIN — FAMOTIDINE 20 MG: 20 TABLET ORAL at 09:04

## 2022-04-26 RX ADMIN — ALBUTEROL SULFATE 2.5 MG: 2.5 SOLUTION RESPIRATORY (INHALATION) at 07:51

## 2022-04-26 RX ADMIN — CARVEDILOL 25 MG: 25 TABLET, FILM COATED ORAL at 21:54

## 2022-04-26 RX ADMIN — CLONAZEPAM 0.25 MG: 0.5 TABLET ORAL at 19:20

## 2022-04-26 RX ADMIN — FUROSEMIDE 60 MG: 10 INJECTION, SOLUTION INTRAVENOUS at 12:04

## 2022-04-26 RX ADMIN — ALBUTEROL SULFATE 2.5 MG: 2.5 SOLUTION RESPIRATORY (INHALATION) at 20:10

## 2022-04-26 RX ADMIN — POTASSIUM CHLORIDE 20 MEQ: 1500 TABLET, EXTENDED RELEASE ORAL at 16:17

## 2022-04-26 RX ADMIN — ALBUTEROL SULFATE 2.5 MG: 2.5 SOLUTION RESPIRATORY (INHALATION) at 15:49

## 2022-04-26 RX ADMIN — AMLODIPINE BESYLATE 5 MG: 5 TABLET ORAL at 21:54

## 2022-04-26 RX ADMIN — Medication 1 MG: at 21:54

## 2022-04-26 RX ADMIN — ACETAMINOPHEN 1000 MG: 500 TABLET, FILM COATED ORAL at 21:54

## 2022-04-26 RX ADMIN — FUROSEMIDE 60 MG: 10 INJECTION, SOLUTION INTRAVENOUS at 21:53

## 2022-04-26 RX ADMIN — METHOCARBAMOL TABLETS 500 MG: 500 TABLET, COATED ORAL at 21:54

## 2022-04-26 RX ADMIN — ACETAMINOPHEN 1000 MG: 500 TABLET, FILM COATED ORAL at 14:42

## 2022-04-26 RX ADMIN — METHOCARBAMOL TABLETS 500 MG: 500 TABLET, COATED ORAL at 14:42

## 2022-04-26 RX ADMIN — CEFTRIAXONE SODIUM 2 G: 2 INJECTION, POWDER, FOR SOLUTION INTRAMUSCULAR; INTRAVENOUS at 21:53

## 2022-04-26 ASSESSMENT — ACTIVITIES OF DAILY LIVING (ADL)
ADLS_ACUITY_SCORE: 15
ADLS_ACUITY_SCORE: 14
ADLS_ACUITY_SCORE: 15
ADLS_ACUITY_SCORE: 14
ADLS_ACUITY_SCORE: 15
ADLS_ACUITY_SCORE: 14
ADLS_ACUITY_SCORE: 15
ADLS_ACUITY_SCORE: 14

## 2022-04-26 NOTE — PLAN OF CARE
Problem: Respiratory Compromise (Pneumonia)  Goal: Effective Oxygenation and Ventilation  Outcome: Ongoing, Progressing   Pt on 3L via NC. Has MCGOWAN and audible expiratory wheezes that resolved after neb tx.   Problem: Pain Acute  Goal: Acceptable Pain Control and Functional Ability  Outcome: Ongoing, Progressing  Intervention: Prevent or Manage Pain  Recent Flowsheet Documentation  Taken 4/25/2022 2000 by Grace Hook, RN  Medication Review/Management: medications reviewed  Taken 4/25/2022 1600 by Grace Hook, RN  Medication Review/Management: medications reviewed   Pt denies pain but having restless legs and given PRN Robaxin that helped some, but then stated legs were achy and gave scheduled tylenol and gabapentin. Will continue to monitor.   NSR on tele. IV abxs given. Pt ate well and voiding adequate amounts.

## 2022-04-26 NOTE — CONSULTS
Care Management Follow Up    Length of Stay (days): 4    Expected Discharge Date: 04/28/2022     Concerns to be Addressed: 02, IV antibiotics, not medically ready for discharge      Patient plan of care discussed at interdisciplinary rounds: Yes    Anticipated Discharge Disposition: Home with assist from family- patient and family decline TCU and HC services    Disposition Comments: Anticipate D/C home with daughter assist. Declines TCU and HC services. Daughter to transport.  Anticipated Discharge Services: None- patient and family decline TCU and HC services   Anticipated Discharge DME:  (TBD- possible home 02 evaluation)    Patient/family educated on Medicare website which has current facility and service quality ratings:  (N/A)  Education Provided on the Discharge Plan: Patient and family decline TCU and HC services. AVS per bedside RN.    Patient/Family in Agreement with the Plan: yes    Referrals Placed by CM/SW:  (patient/family decline)  Private pay costs discussed: None at this time.   Additional Information:  Chart reviewed. Consult to CM re: discharge planning. Initial CM assessment completed 4/24. PT and OT recommendations for home with assist and HC vs TCU.     CM met with patient and daughter Nupur (at bedside). Patient strongly declines TCU and HC services. CM discussed risks and benefits. Daughter agreeable to assisting patient. Shared past experience of feeling no value of having HC services. Offered to send TCU referrals to higher rated facilities but patient and daughter continued to decline. Again, they do not want any HC referrals being sent at this time either. Daughter will transport.     Maddy Malagon RN

## 2022-04-26 NOTE — PLAN OF CARE
Problem: Infection (Pneumonia)  Goal: Resolution of Infection Signs and Symptoms  Outcome: Ongoing, Not Progressing   Goal Outcome Evaluation:      Occasional exp. Wheezes with nonproductive cough. Desats on RA to mid 80's%. Dyspnea on exertion.Low grade temp., IS encouraged. CPAP at HS with O2 4L/NC. Continue to monitor.

## 2022-04-26 NOTE — PROGRESS NOTES
[facility] Daily Progress Note    Assessment/Plan:  89-year-old female with history of heart failure preserved ejection fraction, chronic kidney disease stage IV and history of necrotizing crescentic microscopic polyangiitis, anemia of chronic disease, who presented after suffering a fall in her home with scalp laceration.  Patient was noted to have mild elevation in total CK possible mild rhabdomyolysis.  Diagnosed with bilateral community-acquired pneumonia.  Initiated on IV diuresis for ongoing respiratory failure with hypoxia despite pneumonia treatments.      Bilateral community-acquired pneumonia  Acute respiratory failure with hypoxia  Abnormal blood culture result with Staph.epidermidis.   Procalcitonin elevated at 2.74 on 4/22.   White blood cell count elevated at 14.6->12.8.  Respiratory panel PCR negative.   Sputum culture mixed alana  COVID PCR and influenza screens negative  Blood culture from 4/22 with 1 of 2 bottles growing Staph epidermidis  Repeat blood culture from 4/25 NGTD.     Continue ceftriaxone 2 g IV every 24 hours, Doxycycline discontinued on 4/25 with positive blood culture result.   Continue vancomycin pharmacy to dose 4/25-present.   Albuterol neb every 6 hours as needed  RCAT  Incentive spirometry  Continue supplemental oxygen, continue taper as tolerated.   Will require repeat CT scan in 4 to 6 months to ensure resolution of pneumonia.     Acute on chronic diastolic congestive heart failure  Echocardiogram January 18, 2022, LVEF 71%, grade 1 left ventricular diastolic dysfunction.  No significant valvular disease.   improved to 300s.    Bilateral lung infiltrates likely consistent with multifocal pneumonia.    Small pleural effusion noted.  Hold torsemide.   Order Lasix 60 mg IV q12h for 2 doses.   Tomas catheter placement.   Magnesium and potassium replacement protocols.     Mild elevation in total CK  Resolved.  Likely mild rhabdomyolysis from recent fall.  Total CK normalized.    Resume rosuvastatin 5 mg nightly     Fall with scalp laceration  Status post suture placement in the ED.  Fall precautions  Lower extremity ultrasound negative for DVT  PT OT assessment tomorrow.     Chronic anemia normocytic.  Anemia of chronic kidney disease  Hemoglobin 9.2-.7.7 g/dL  Monitor CBC.     Obstructive sleep apnea on CPAP  Continue home CPAP home settings.     Chronic kidney disease stage IV  History of necrotizing crescentic microscopic polyangiitis  Follows with Dr. Deandre Vernon, associated nephrology  Baseline creatinine around 2.0.  Creatinine 1.9 currently, stable.  Monitor renal function panel daily     Hypothyroidism  Hyperlipidemia  Fibromyalgia  Celiac sprue  Paroxysmal atrial fibrillation  Continue levothyroxine 50 mcg daily.  Continue gabapentin 100 mg twice daily  Continue famotidine 20 mg daily  Continue carvedilol 25 mg twice daily  Continue amlodipine 5 mg twice     COVID-19 status  SARS-CoV-2 PCR negative.    Diet: Combination Diet Low Saturated Fat Na <2400mg Diet, No Caffeine Diet  DVT Prophylaxis:  Heparin SQ  Code Status: Full Code    Active Problems:    Fall, initial encounter    Pneumonia of both lower lobes due to infectious organism    Dyspnea, unspecified type     LOS: 4 days     Barriers to discharge: Clinical improvement, acute respiratory failure with hypoxia, community acquired pneumonia.   Discharge Disposition: Home with assist, home care PT, OT, RN.    Subjective:  Yuki has no new complaints.  She is currently on supplemental oxygen at a rate of 4 L/min.  She is saturating between 94 and 95% during hospital visit.  She continues to complain of cough with clear to white sputum.  Denies hemoptysis.  No chest pain.  Patient denies any significant shortness of breath currently at rest.  No significant events overnight per nursing report.        acetaminophen  1,000 mg Oral TID     albuterol  2.5 mg Nebulization Q6H     amLODIPine  5 mg Oral BID     carvedilol  25 mg  Oral BID     cefTRIAXone  2 g Intravenous Q24H     famotidine  20 mg Oral Q48H     gabapentin  100 mg Oral BID     heparin ANTICOAGULANT  5,000 Units Subcutaneous Q12H     levothyroxine  50 mcg Oral Daily     mineral oil-hydrophilic petrolatum   Topical Daily     rosuvastatin  5 mg Oral At Bedtime     sodium chloride (PF)  3 mL Intracatheter Q8H     torsemide  20 mg Oral Daily     torsemide  40 mg Oral Daily     vancomycin  750 mg Intravenous Q24H       Objective:  Vital signs in last 24 hours:  Temp:  [98.1  F (36.7  C)-99  F (37.2  C)] 98.9  F (37.2  C)  Pulse:  [62-70] 68  Resp:  [20-22] 20  BP: (125-154)/(58-70) 146/66  SpO2:  [95 %-97 %] 95 %  Weight:   Weight:   @THISENCWEIGHTS(1)@  Weight change: -1.225 kg (-2 lb 11.2 oz)  Body mass index is 36.04 kg/m .    Intake/Output last 3 shifts:  I/O last 3 completed shifts:  In: 1090 [P.O.:1090]  Out: 0   Intake/Output this shift:  No intake/output data recorded.    Review of Systems:   As per subjective, all others negative.    Physical Exam:    GENERAL:  Alert, appears comfortable, in no acute distress, appears stated age   HEAD:  Normocephalic, without obvious abnormality, atraumatic   NECK: Supple, symmetrical, trachea midline   LUNGS:   Diminished breath sounds at bases, upper airway wheezing, symmetric chest rise on inhalation, respirations unlabored   HEART:  Regular rate and rhythm, S1 and S2 normal, no murmur, rub, or gallop    ABDOMEN:   Soft, non-tender, bowel sounds active all four quadrants, no masses, no organomegaly, no rebound or guarding   EXTREMITIES: Extremities normal, atraumatic, trace edema.   SKIN: Dry to touch, no exanthems in the visualized areas   NEURO: Alert, oriented x3, moves all four extremities freely, non-focal   PSYCH: Cooperative, behavior is appropriate      Imaging:  Personally Reviewed.  Results for orders placed or performed during the hospital encounter of 04/22/22   CT Chest Pulmonary Embolism w Contrast    Impression     IMPRESSION:  1.  There is no pulmonary embolus, aortic aneurysm or dissection.  2.  Bilateral multifocal pneumonia.  3.  Small right pleural effusion.  4.  Several mildly enlarged mediastinal and right hilar lymph nodes.  5.  Several small indeterminate lung nodules. Follow-up recommended.  Recommendations for one or multiple incidental lung nodules < 6mm :    Low risk patients: No routine follow-up.    High risk patients: Optional follow-up CT at 12 months; if unchanged, no further follow-up.  *Low Risk: Minimal or absent history of smoking or other known risk factors.  *Nonsolid (ground glass) or partly solid nodules may require longer follow-up to exclude indolent adenocarcinoma.  *Recommendations based on Guidelines for the Management of Incidental Pulmonary Nodules Detected at CT: From the Fleischner Society 2017, Radiology 2017.     US Lower Extremity Venous Duplex Bilateral    Impression    IMPRESSION:  1.  No deep venous thrombosis in the bilateral lower extremities.   CT Lumbar Spine w/o Contrast    Impression    IMPRESSION:  1.  No acute fracture or subluxation.  2.  Advanced multilevel degenerative changes with moderate to severe central canal stenosis at L3-L4 and moderate at L4-L5 and L5-S1.  3.  High-grade foraminal narrowing on the left at L3-L4 and L4-L5.   CT Pelvis Bone wo Contrast    Impression    IMPRESSION:  1.  No definitive evidence for acute displaced fracture or malalignment. Degenerative changes at the symphysis pubis, both SI joints and throughout the lumbar spine.  2.  Subcutaneous edema overlying the pelvis and the proximal thighs.   CT Hip Bilateral w/o Contrast    Impression    IMPRESSION:  1.  Osteopenia. No definitive evidence for acute displaced fracture. If there remains persistent pelvic pain or clinical concern for fracture, consider noncontrast MRI which would be more sensitive for nondisplaced fractures or bone marrow edema.  2.  Degenerative changes at both hips, symphysis  pubis and SI joints.  3.  No evidence for intramuscular hematoma. Minimal subcutaneous edema involving the pelvis and proximal thighs.       Lab Results:  Personally Reviewed.   Recent Labs   Lab 04/25/22  0107 04/24/22  0531 04/22/22  2339   WBC 12.8* 14.6* 14.7*   HGB 7.7* 8.7* 9.2*   HCT 24.1* 27.2* 28.6*    215 186     Recent Labs   Lab 04/26/22  0516 04/25/22 0107 04/24/22  0531 04/23/22  0345    136 140 139   CO2 23 21* 22 22   BUN 34* 36* 35* 33*   ALBUMIN 2.4* 2.6*  --  2.9*   ALKPHOS  --  84  --  76   ALT  --  16  --  14   AST  --  23  --  29     No results for input(s): INR in the last 168 hours.    I reviewed all labs and imaging studies as of this date and I reviewed all current inpatient medications and updated them    Sanjay Parson DO, MS  [facility] Service  Internal Medicine

## 2022-04-26 NOTE — PROGRESS NOTES
"BP (!) 145/65 (BP Location: Right arm, Patient Position: Semi-Escalante's, Cuff Size: Adult Regular)   Pulse 68   Temp 98  F (36.7  C) (Oral)   Resp 19   Ht 1.651 m (5' 5\")   Wt 98.2 kg (216 lb 9.6 oz)   SpO2 95%   BMI 36.04 kg/m      Patient is receiving albuterol Q6. Breath sounds are improving, patient was wheezing this am. But improved this afternoon. Patient on 4L NC. Patient has own cpap here. RT continue to follow.     Ronel Guerrier, RT      "

## 2022-04-26 NOTE — PLAN OF CARE
Problem: Plan of Care - These are the overarching goals to be used throughout the patient stay.    Goal: Absence of Hospital-Acquired Illness or Injury  Intervention: Identify and Manage Fall Risk  Recent Flowsheet Documentation  Taken 4/26/2022 0900 by Adela Vera RN  Safety Promotion/Fall Prevention:    nonskid shoes/slippers when out of bed    mobility aid in reach    lighting adjusted    increase visualization of patient    increased rounding and observation    fall prevention program maintained    clutter free environment maintained    activity supervised     Problem: Respiratory Compromise (Pneumonia)  Goal: Effective Oxygenation and Ventilation  Intervention: Promote Airway Secretion Clearance  Recent Flowsheet Documentation  Taken 4/26/2022 1300 by Adela Vera RN  Cough And Deep Breathing: done independently per patient  Taken 4/26/2022 0900 by Adela Vera RN  Cough And Deep Breathing: done independently per patient     Patient reported overall improvement and generalized pain controlled with scheduled Tylenol and PRN Robaxin given. Patient alert and oriented but disoriented to situation and place. VSS. LS diminished with minimal expiratory wheezes. IS use encouraged. On Tele NSR. Staples to laceration scalp site clean dry and intact. Fall precautions in place. Up with assist of one with walker. PT/OT worked with patient. Heavy incontinent of bladder. Anticipate possible discharge home tomorrow pending medical progression. Will monitor and continue plan of care.

## 2022-04-27 ENCOUNTER — TELEPHONE (OUTPATIENT)
Dept: CARDIOLOGY | Facility: CLINIC | Age: 87
End: 2022-04-27
Payer: COMMERCIAL

## 2022-04-27 DIAGNOSIS — I50.21 ACUTE SYSTOLIC CONGESTIVE HEART FAILURE (H): Primary | ICD-10-CM

## 2022-04-27 LAB
ANION GAP SERPL CALCULATED.3IONS-SCNC: 13 MMOL/L (ref 5–18)
BACTERIA BLD CULT: ABNORMAL
BACTERIA BLD CULT: ABNORMAL
BUN SERPL-MCNC: 39 MG/DL (ref 8–28)
CALCIUM SERPL-MCNC: 9.1 MG/DL (ref 8.5–10.5)
CHLORIDE BLD-SCNC: 107 MMOL/L (ref 98–107)
CO2 SERPL-SCNC: 24 MMOL/L (ref 22–31)
CREAT SERPL-MCNC: 1.82 MG/DL (ref 0.6–1.1)
ERYTHROCYTE [DISTWIDTH] IN BLOOD BY AUTOMATED COUNT: 16.4 % (ref 10–15)
GFR SERPL CREATININE-BSD FRML MDRD: 26 ML/MIN/1.73M2
GLUCOSE BLD-MCNC: 99 MG/DL (ref 70–125)
HCT VFR BLD AUTO: 25.1 % (ref 35–47)
HGB BLD-MCNC: 8.2 G/DL (ref 11.7–15.7)
MAGNESIUM SERPL-MCNC: 1.8 MG/DL (ref 1.8–2.6)
MCH RBC QN AUTO: 27.4 PG (ref 26.5–33)
MCHC RBC AUTO-ENTMCNC: 32.7 G/DL (ref 31.5–36.5)
MCV RBC AUTO: 84 FL (ref 78–100)
PLATELET # BLD AUTO: 261 10E3/UL (ref 150–450)
POTASSIUM BLD-SCNC: 3.5 MMOL/L (ref 3.5–5)
RBC # BLD AUTO: 2.99 10E6/UL (ref 3.8–5.2)
SODIUM SERPL-SCNC: 144 MMOL/L (ref 136–145)
WBC # BLD AUTO: 12.3 10E3/UL (ref 4–11)

## 2022-04-27 PROCEDURE — 250N000011 HC RX IP 250 OP 636: Performed by: INTERNAL MEDICINE

## 2022-04-27 PROCEDURE — 36415 COLL VENOUS BLD VENIPUNCTURE: CPT | Performed by: INTERNAL MEDICINE

## 2022-04-27 PROCEDURE — 999N000157 HC STATISTIC RCP TIME EA 10 MIN

## 2022-04-27 PROCEDURE — 99232 SBSQ HOSP IP/OBS MODERATE 35: CPT | Performed by: INTERNAL MEDICINE

## 2022-04-27 PROCEDURE — 250N000013 HC RX MED GY IP 250 OP 250 PS 637: Performed by: INTERNAL MEDICINE

## 2022-04-27 PROCEDURE — 80048 BASIC METABOLIC PNL TOTAL CA: CPT | Performed by: INTERNAL MEDICINE

## 2022-04-27 PROCEDURE — 120N000004 HC R&B MS OVERFLOW

## 2022-04-27 PROCEDURE — 83735 ASSAY OF MAGNESIUM: CPT | Performed by: INTERNAL MEDICINE

## 2022-04-27 PROCEDURE — 94640 AIRWAY INHALATION TREATMENT: CPT

## 2022-04-27 PROCEDURE — 250N000009 HC RX 250: Performed by: INTERNAL MEDICINE

## 2022-04-27 PROCEDURE — 94640 AIRWAY INHALATION TREATMENT: CPT | Mod: 76

## 2022-04-27 PROCEDURE — 85027 COMPLETE CBC AUTOMATED: CPT | Performed by: INTERNAL MEDICINE

## 2022-04-27 RX ORDER — POTASSIUM CHLORIDE 750 MG/1
10 TABLET, EXTENDED RELEASE ORAL ONCE
Status: COMPLETED | OUTPATIENT
Start: 2022-04-27 | End: 2022-04-27

## 2022-04-27 RX ORDER — FUROSEMIDE 10 MG/ML
60 INJECTION INTRAMUSCULAR; INTRAVENOUS EVERY 12 HOURS
Status: COMPLETED | OUTPATIENT
Start: 2022-04-27 | End: 2022-04-27

## 2022-04-27 RX ORDER — MAGNESIUM OXIDE 400 MG/1
400 TABLET ORAL 2 TIMES DAILY
Status: DISCONTINUED | OUTPATIENT
Start: 2022-04-27 | End: 2022-04-28 | Stop reason: HOSPADM

## 2022-04-27 RX ORDER — DOXYCYCLINE 100 MG/1
100 CAPSULE ORAL EVERY 12 HOURS SCHEDULED
Status: DISCONTINUED | OUTPATIENT
Start: 2022-04-27 | End: 2022-04-28 | Stop reason: HOSPADM

## 2022-04-27 RX ADMIN — FUROSEMIDE 60 MG: 10 INJECTION, SOLUTION INTRAVENOUS at 17:19

## 2022-04-27 RX ADMIN — MAGNESIUM OXIDE TAB 400 MG (241.3 MG ELEMENTAL MG) 400 MG: 400 (241.3 MG) TAB at 20:44

## 2022-04-27 RX ADMIN — AMLODIPINE BESYLATE 5 MG: 5 TABLET ORAL at 20:44

## 2022-04-27 RX ADMIN — CARVEDILOL 25 MG: 25 TABLET, FILM COATED ORAL at 20:44

## 2022-04-27 RX ADMIN — HEPARIN SODIUM 5000 UNITS: 5000 INJECTION, SOLUTION INTRAVENOUS; SUBCUTANEOUS at 23:09

## 2022-04-27 RX ADMIN — GABAPENTIN 100 MG: 100 CAPSULE ORAL at 09:57

## 2022-04-27 RX ADMIN — ACETAMINOPHEN 1000 MG: 500 TABLET, FILM COATED ORAL at 20:43

## 2022-04-27 RX ADMIN — FUROSEMIDE 60 MG: 10 INJECTION, SOLUTION INTRAVENOUS at 09:57

## 2022-04-27 RX ADMIN — HEPARIN SODIUM 5000 UNITS: 5000 INJECTION, SOLUTION INTRAVENOUS; SUBCUTANEOUS at 09:57

## 2022-04-27 RX ADMIN — MAGNESIUM OXIDE TAB 400 MG (241.3 MG ELEMENTAL MG) 400 MG: 400 (241.3 MG) TAB at 09:57

## 2022-04-27 RX ADMIN — AMLODIPINE BESYLATE 5 MG: 5 TABLET ORAL at 09:57

## 2022-04-27 RX ADMIN — ACETAMINOPHEN 1000 MG: 500 TABLET, FILM COATED ORAL at 17:23

## 2022-04-27 RX ADMIN — LEVOTHYROXINE SODIUM 50 MCG: 0.05 TABLET ORAL at 06:59

## 2022-04-27 RX ADMIN — CARVEDILOL 25 MG: 25 TABLET, FILM COATED ORAL at 09:57

## 2022-04-27 RX ADMIN — ROSUVASTATIN CALCIUM 5 MG: 5 TABLET, FILM COATED ORAL at 20:44

## 2022-04-27 RX ADMIN — WHITE PETROLATUM: 1.75 OINTMENT TOPICAL at 09:58

## 2022-04-27 RX ADMIN — DOXYCYCLINE 100 MG: 100 CAPSULE ORAL at 20:45

## 2022-04-27 RX ADMIN — POTASSIUM CHLORIDE 10 MEQ: 750 TABLET, EXTENDED RELEASE ORAL at 09:57

## 2022-04-27 RX ADMIN — ALBUTEROL SULFATE 2.5 MG: 2.5 SOLUTION RESPIRATORY (INHALATION) at 09:13

## 2022-04-27 RX ADMIN — CEFTRIAXONE SODIUM 2 G: 2 INJECTION, POWDER, FOR SOLUTION INTRAMUSCULAR; INTRAVENOUS at 23:09

## 2022-04-27 RX ADMIN — METHOCARBAMOL TABLETS 500 MG: 500 TABLET, COATED ORAL at 17:19

## 2022-04-27 RX ADMIN — ALBUTEROL SULFATE 2.5 MG: 2.5 SOLUTION RESPIRATORY (INHALATION) at 04:59

## 2022-04-27 RX ADMIN — GABAPENTIN 100 MG: 100 CAPSULE ORAL at 20:45

## 2022-04-27 RX ADMIN — ACETAMINOPHEN 1000 MG: 500 TABLET, FILM COATED ORAL at 09:56

## 2022-04-27 ASSESSMENT — ACTIVITIES OF DAILY LIVING (ADL)
ADLS_ACUITY_SCORE: 14
ADLS_ACUITY_SCORE: 16
ADLS_ACUITY_SCORE: 14
ADLS_ACUITY_SCORE: 14
ADLS_ACUITY_SCORE: 16
ADLS_ACUITY_SCORE: 14
ADLS_ACUITY_SCORE: 16
ADLS_ACUITY_SCORE: 14
ADLS_ACUITY_SCORE: 16
ADLS_ACUITY_SCORE: 14
ADLS_ACUITY_SCORE: 14
ADLS_ACUITY_SCORE: 16
ADLS_ACUITY_SCORE: 14
ADLS_ACUITY_SCORE: 16
ADLS_ACUITY_SCORE: 14

## 2022-04-27 NOTE — PROGRESS NOTES
Nutrition Therapy   Education      Met with pt and family to discuss a low sodium diet. Both pt and daughter felt pt follows and knows a low sodium diet. She will have fast food on occasion and chicken nuggets which they know are higher in sodium. She is gluten free and they are frustrated she hasn't been getting gluten free options. I corrected the diet order to reflect gluten free. No education provided at this time

## 2022-04-27 NOTE — PROGRESS NOTES
"BP (!) 145/61 (BP Location: Right arm)   Pulse 64   Temp 99.1  F (37.3  C) (Oral)   Resp 20   Ht 1.651 m (5' 5\")   Wt 98.8 kg (217 lb 12.8 oz)   SpO2 95%   BMI 36.24 kg/m      Patient is on 3L NC. Patient wore her cpap over night. Albuterol nebs changed to prn. Breath sounds are clear/diminshed. Upper airway wheezing noted with exertion. Strong cough, that is productive. RT continue to follow.     Ronel Guerrier, RT      "

## 2022-04-27 NOTE — PLAN OF CARE
Problem: Respiratory Compromise (Pneumonia)  Goal: Effective Oxygenation and Ventilation  Outcome: Ongoing, Progressing  Intervention: Promote Airway Secretion Clearance  Recent Flowsheet Documentation  Taken 4/26/2022 2000 by Grace Hook RN  Cough And Deep Breathing: done with encouragement  Taken 4/26/2022 1600 by Grace Hook RN  Cough And Deep Breathing: done with encouragement   Pt on 4L via NC with 02 sats 92-96%. Lungs clear in upper fields and diminished in the bases. Pt has MCGOWAN but no sob at rest. NSR on tele. Pt having restless achy legs and too soon for robaxin or tylenol. MD paged and order received for PRN clonazepam to help with restless legs and effective.   Problem: Fall Injury Risk  Goal: Absence of Fall and Fall-Related Injury  Outcome: Ongoing, Progressing  Intervention: Identify and Manage Contributors  Recent Flowsheet Documentation  Taken 4/26/2022 2000 by Grace Hook, RN  Medication Review/Management: medications reviewed  Taken 4/26/2022 1600 by Grace Hook RN  Medication Review/Management: medications reviewed  Intervention: Promote Injury-Free Environment  Recent Flowsheet Documentation  Taken 4/26/2022 2000 by Grace Hook, RN  Safety Promotion/Fall Prevention:   activity supervised   bed alarm on   fall prevention program maintained   lighting adjusted   mobility aid in reach   nonskid shoes/slippers when out of bed   patient and family education   safety round/check completed  Taken 4/26/2022 1600 by Grace Hook, RN  Safety Promotion/Fall Prevention:   activity supervised   bed alarm on   fall prevention program maintained   lighting adjusted   mobility aid in reach   nonskid shoes/slippers when out of bed   patient and family education   safety round/check completed   Goal Outcome Evaluation:

## 2022-04-27 NOTE — PLAN OF CARE
Alert, disoriented to time. No pain. C/o restless legs- robaxin given this kike. NSR. Weaned from 3L to RA. Continues on IV lasix. Incontinent/ up to commode. Ao1 w walker. L AC PIV SL.

## 2022-04-27 NOTE — PROGRESS NOTES
[facility] Medicine PROGRESS NOTE      Identification/Summary:      Yuki Mock is a 89 year old female with a past medical history of HFpEF, CKD 4, necrotizing crescentic microscopic polyangiitis, ASCVD who was admitted on 4/22/2022 after a fall in her home with a scalp laceration, found to have mildly elevated CK, bilateral pneumonia, was initiated on IV diuresis as well as antibiotics.  1/2 culture positive for staph epi, was started on IV vancomycin along with the continuation of ceftriaxone from admission..     Assessment & Plan      Bilateral community-acquired pneumonia  Acute respiratory failure with hypoxia  Respiratory virus panel negative, sputum culture mixed alana, COVID-19 and influenza negative  WBC, hypoxia improving, currently requiring 1 L oxygen  Continue ceftriaxone, discontinue vancomycin, add back doxycycline  Repeat CT scan in 3 months to ensure resolution of infiltrates    abnormal blood culture/staph epidermidis 1/2  Most likely contaminant, discontinue vancomycin    Mildly elevated CK  CK normalized, rosuvastatin resumed    Acute on chronic HFpEF  LVEF 71% 1/18/2022, grade 1 diastolic dysfunction  Elevated BNP, small pleural effusion  Holding torsemide  Status post Lasix 60 mg IV every 12, give 1 more dose today and then resume oral torsemide on 4/28/2022    Fall with a scalp laceration  Sutures placed in the ED  Lower extremity Dopplers negative for DVT  Continue PT and OT    Chronic anemia normocytic.  Anemia of chronic kidney disease  Hemoglobin 9.2-.7.7 g/dL  Monitor CBC.     Obstructive sleep apnea on CPAP  Continue home CPAP home settings.     Chronic kidney disease stage IV  History of necrotizing crescentic microscopic polyangiitis  Follows with Dr. Deandre Vernon, associated nephrology  Baseline creatinine around 2.0.  Creatinine 1.82 currently, stable.  Monitor renal function panel daily     Hypothyroidism  Hyperlipidemia  Fibromyalgia  Celiac sprue  Paroxysmal  "atrial fibrillation  Continue levothyroxine 50 mcg daily.  Continue gabapentin 100 mg twice daily  Continue famotidine 20 mg daily  Continue carvedilol 25 mg twice daily  Continue amlodipine 5 mg twice          Clinically Significant Risk Factors Present on Admission                       Diet: 2 Gram Sodium Diet Other - please comment  DVT Prophylaxis: subcutaneous heparin   Tomas Catheter: Not present  Central Lines: None    Code Status: Full Code    Discharge Planning   Anticipated Discharge in :1 day  Expected Discharge Destination: Home   Milestones/Criteria For Discharge: Improving oxygenation, stable vitals  Disposition Plan   Expected Discharge: 04/28/2022     Anticipated discharge location: home with family           The patient's care was discussed with the Bedside Nurse and Patient.      Interval History/Subjective:     89-year-old female admitted with a fall, laceration, found to be hypoxic with a bilateral pneumonia.  Reports breathing is significantly improved, has a cough with a clear sputum, no fever or chills, denies any abdominal pain or nausea or vomiting    Physical Exam/Objective:     Vitals I/O   Vital signs:  Temp: 99.1  F (37.3  C) Temp src: Oral BP: (!) 145/61 Pulse: 64   Resp: 20 SpO2: 95 % O2 Device: Nasal cannula Oxygen Delivery: 3 LPM Height: 165.1 cm (5' 5\") Weight: 98.8 kg (217 lb 12.8 oz)  Estimated body mass index is 36.24 kg/m  as calculated from the following:    Height as of this encounter: 1.651 m (5' 5\").    Weight as of this encounter: 98.8 kg (217 lb 12.8 oz).       I/O last 3 completed shifts:  In: 670 [P.O.:420; I.V.:250]  Out: 300 [Urine:300]     Vitals:    04/25/22 0130 04/26/22 0329 04/27/22 0520   Weight: 99.5 kg (219 lb 4.8 oz) 98.2 kg (216 lb 9.6 oz) 98.8 kg (217 lb 12.8 oz)      Weight change: 0.544 kg (1 lb 3.2 oz)   Body mass index is 36.24 kg/m .    General: Awake alert and comfortable  Lungs: Bilateral basilar crackles  Heart: S1, S2 without murmurs  Abdomen: Soft " nontender, bowel sounds positive  CNS: Nonfocal  Extremities: Mild bilateral lower extremity edema (patient reports this is at baseline, chronic)      Medications:     Medications     - MEDICATION INSTRUCTIONS -         acetaminophen  1,000 mg Oral TID     amLODIPine  5 mg Oral BID     carvedilol  25 mg Oral BID     cefTRIAXone  2 g Intravenous Q24H     doxycycline hyclate  100 mg Oral Q12H MARNIE     famotidine  20 mg Oral Q48H     furosemide  60 mg Intravenous Q12H     gabapentin  100 mg Oral BID     heparin ANTICOAGULANT  5,000 Units Subcutaneous Q12H     levothyroxine  50 mcg Oral Daily     magnesium oxide  400 mg Oral BID     mineral oil-hydrophilic petrolatum   Topical Daily     rosuvastatin  5 mg Oral At Bedtime     sodium chloride (PF)  3 mL Intracatheter Q8H     [Held by provider] torsemide  20 mg Oral Daily     [Held by provider] torsemide  40 mg Oral Daily       Data Reviewed   I personally reviewed all new medications, labs, imaging/diagnostics reports over the past 24 hours.     Pertinent findings include hemoglobin 8.2, WBC 12.3.     Labs    Recent Labs   Lab 04/27/22  0515 04/27/22  0514 04/26/22  2040 04/26/22  0955 04/26/22  0516 04/25/22  0107 04/24/22  0531 04/23/22  0345   WBC  --  12.3*  --  12.6*  --  12.8*   < >  --    HGB  --  8.2*  --  7.5*  --  7.7*   < >  --    MCV  --  84  --  85  --  85   < >  --    PLT  --  261  --  229  --  218   < >  --      --   --   --  143 136   < > 139   POTASSIUM 3.5  --  3.7 3.4* 3.4* 3.5   < > 3.6   CHLORIDE 107  --   --   --  107 101   < > 103   CO2 24  --   --   --  23 21*   < > 22   BUN 39*  --   --   --  34* 36*   < > 33*   CR 1.82*  --   --   --  1.96* 2.23*   < > 1.90*   ANIONGAP 13  --   --   --  13 14   < > 14   OWEN 9.1  --   --   --  9.1 8.7   < > 8.5   GLC 99  --   --   --  102 116   < > 107   ALBUMIN  --   --   --   --  2.4* 2.6*  --  2.9*   PROTTOTAL  --   --   --   --   --  6.0  --  6.2   BILITOTAL  --   --   --   --   --  0.4  --  0.6   ALKPHOS   --   --   --   --   --  84  --  76   ALT  --   --   --   --   --  16  --  14   AST  --   --   --   --   --  23  --  29    < > = values in this interval not displayed.       Imaging   No results found for this or any previous visit (from the past 24 hour(s)).      EKG:      Lynn Liz MD  Internal Medicine / [facility]  Securely message with the Vocera Web Console (learn more here)  Text page via [employer] ([email or web address])

## 2022-04-27 NOTE — PROGRESS NOTES
Pt slept well at intervals with CPAP in place. VSS, afebrile. Incontinent in the night but up to commode this am with one assist. Denies pain just tenderness when touched in certain spots. Denied restless legs during the night. Lungs with intermittent scattered wheezes. Nebs prn per RT. Bed alarm in place.

## 2022-04-28 ENCOUNTER — DOCUMENTATION ONLY (OUTPATIENT)
Dept: HOME HEALTH SERVICES | Facility: CLINIC | Age: 87
End: 2022-04-28
Payer: COMMERCIAL

## 2022-04-28 VITALS
OXYGEN SATURATION: 92 % | HEART RATE: 64 BPM | BODY MASS INDEX: 35.24 KG/M2 | HEIGHT: 65 IN | TEMPERATURE: 99 F | RESPIRATION RATE: 20 BRPM | WEIGHT: 211.5 LBS | DIASTOLIC BLOOD PRESSURE: 72 MMHG | SYSTOLIC BLOOD PRESSURE: 169 MMHG

## 2022-04-28 LAB
ANION GAP SERPL CALCULATED.3IONS-SCNC: 15 MMOL/L (ref 5–18)
BACTERIA BLD CULT: NO GROWTH
BUN SERPL-MCNC: 32 MG/DL (ref 8–28)
CALCIUM SERPL-MCNC: 9.2 MG/DL (ref 8.5–10.5)
CHLORIDE BLD-SCNC: 108 MMOL/L (ref 98–107)
CO2 SERPL-SCNC: 22 MMOL/L (ref 22–31)
CREAT SERPL-MCNC: 1.63 MG/DL (ref 0.6–1.1)
ERYTHROCYTE [DISTWIDTH] IN BLOOD BY AUTOMATED COUNT: 16.6 % (ref 10–15)
GFR SERPL CREATININE-BSD FRML MDRD: 30 ML/MIN/1.73M2
GLUCOSE BLD-MCNC: 99 MG/DL (ref 70–125)
HCT VFR BLD AUTO: 27.3 % (ref 35–47)
HGB BLD-MCNC: 8.6 G/DL (ref 11.7–15.7)
MAGNESIUM SERPL-MCNC: 1.9 MG/DL (ref 1.8–2.6)
MCH RBC QN AUTO: 27.1 PG (ref 26.5–33)
MCHC RBC AUTO-ENTMCNC: 31.5 G/DL (ref 31.5–36.5)
MCV RBC AUTO: 86 FL (ref 78–100)
PLATELET # BLD AUTO: 310 10E3/UL (ref 150–450)
POTASSIUM BLD-SCNC: 3.5 MMOL/L (ref 3.5–5)
RBC # BLD AUTO: 3.17 10E6/UL (ref 3.8–5.2)
SODIUM SERPL-SCNC: 145 MMOL/L (ref 136–145)
WBC # BLD AUTO: 12.5 10E3/UL (ref 4–11)

## 2022-04-28 PROCEDURE — 85027 COMPLETE CBC AUTOMATED: CPT | Performed by: INTERNAL MEDICINE

## 2022-04-28 PROCEDURE — 250N000011 HC RX IP 250 OP 636: Performed by: INTERNAL MEDICINE

## 2022-04-28 PROCEDURE — 36415 COLL VENOUS BLD VENIPUNCTURE: CPT | Performed by: INTERNAL MEDICINE

## 2022-04-28 PROCEDURE — 99239 HOSP IP/OBS DSCHRG MGMT >30: CPT | Performed by: INTERNAL MEDICINE

## 2022-04-28 PROCEDURE — 250N000013 HC RX MED GY IP 250 OP 250 PS 637: Performed by: INTERNAL MEDICINE

## 2022-04-28 PROCEDURE — 83735 ASSAY OF MAGNESIUM: CPT | Performed by: INTERNAL MEDICINE

## 2022-04-28 PROCEDURE — 80048 BASIC METABOLIC PNL TOTAL CA: CPT | Performed by: INTERNAL MEDICINE

## 2022-04-28 RX ORDER — POTASSIUM CHLORIDE 750 MG/1
10 TABLET, EXTENDED RELEASE ORAL ONCE
Status: COMPLETED | OUTPATIENT
Start: 2022-04-28 | End: 2022-04-28

## 2022-04-28 RX ORDER — DOXYCYCLINE 100 MG/1
100 CAPSULE ORAL EVERY 12 HOURS
Qty: 10 CAPSULE | Refills: 0 | Status: SHIPPED | OUTPATIENT
Start: 2022-04-28 | End: 2022-05-03

## 2022-04-28 RX ORDER — CEFDINIR 300 MG/1
300 CAPSULE ORAL DAILY
Qty: 5 CAPSULE | Refills: 0 | Status: SHIPPED | OUTPATIENT
Start: 2022-04-28 | End: 2022-05-03

## 2022-04-28 RX ORDER — ALBUTEROL SULFATE 90 UG/1
2 AEROSOL, METERED RESPIRATORY (INHALATION) EVERY 6 HOURS PRN
Qty: 18 G | Refills: 0 | Status: SHIPPED | OUTPATIENT
Start: 2022-04-28

## 2022-04-28 RX ORDER — BENZONATATE 100 MG/1
100 CAPSULE ORAL 3 TIMES DAILY PRN
Qty: 21 CAPSULE | Refills: 0 | Status: SHIPPED | OUTPATIENT
Start: 2022-04-28 | End: 2022-05-05

## 2022-04-28 RX ORDER — OMEPRAZOLE 20 MG/1
20 TABLET, DELAYED RELEASE ORAL DAILY
Qty: 30 TABLET | Refills: 0 | Status: SHIPPED | OUTPATIENT
Start: 2022-04-28

## 2022-04-28 RX ORDER — MAGNESIUM SULFATE HEPTAHYDRATE 40 MG/ML
2 INJECTION, SOLUTION INTRAVENOUS ONCE
Status: COMPLETED | OUTPATIENT
Start: 2022-04-28 | End: 2022-04-28

## 2022-04-28 RX ADMIN — POTASSIUM CHLORIDE 10 MEQ: 750 TABLET, EXTENDED RELEASE ORAL at 08:44

## 2022-04-28 RX ADMIN — CARVEDILOL 25 MG: 25 TABLET, FILM COATED ORAL at 08:44

## 2022-04-28 RX ADMIN — CLONAZEPAM 0.25 MG: 0.5 TABLET ORAL at 04:41

## 2022-04-28 RX ADMIN — GABAPENTIN 100 MG: 100 CAPSULE ORAL at 08:44

## 2022-04-28 RX ADMIN — MAGNESIUM SULFATE HEPTAHYDRATE 2 G: 2 INJECTION, SOLUTION INTRAVENOUS at 08:46

## 2022-04-28 RX ADMIN — DOXYCYCLINE 100 MG: 100 CAPSULE ORAL at 08:44

## 2022-04-28 RX ADMIN — HEPARIN SODIUM 5000 UNITS: 5000 INJECTION, SOLUTION INTRAVENOUS; SUBCUTANEOUS at 08:47

## 2022-04-28 RX ADMIN — LEVOTHYROXINE SODIUM 50 MCG: 0.05 TABLET ORAL at 06:14

## 2022-04-28 RX ADMIN — METHOCARBAMOL TABLETS 500 MG: 500 TABLET, COATED ORAL at 01:32

## 2022-04-28 RX ADMIN — ACETAMINOPHEN 1000 MG: 500 TABLET, FILM COATED ORAL at 08:44

## 2022-04-28 RX ADMIN — FAMOTIDINE 20 MG: 20 TABLET ORAL at 08:44

## 2022-04-28 RX ADMIN — MAGNESIUM OXIDE TAB 400 MG (241.3 MG ELEMENTAL MG) 400 MG: 400 (241.3 MG) TAB at 08:44

## 2022-04-28 RX ADMIN — WHITE PETROLATUM: 1.75 OINTMENT TOPICAL at 08:44

## 2022-04-28 RX ADMIN — AMLODIPINE BESYLATE 5 MG: 5 TABLET ORAL at 08:44

## 2022-04-28 ASSESSMENT — ACTIVITIES OF DAILY LIVING (ADL)
ADLS_ACUITY_SCORE: 10
ADLS_ACUITY_SCORE: 14
ADLS_ACUITY_SCORE: 10
ADLS_ACUITY_SCORE: 14
ADLS_ACUITY_SCORE: 10
ADLS_ACUITY_SCORE: 14
ADLS_ACUITY_SCORE: 10

## 2022-04-28 NOTE — PLAN OF CARE
Goal: Optimal Comfort and Wellbeing  Outcome: Ongoing, Progressing     Problem: Infection (Pneumonia)  Goal: Resolution of Infection Signs and Symptoms  Outcome: Ongoing, Progressing     Pt denies pain, but complains that her restless legs bother her. Gave robaxin x1 and clonazepam x1. Pt on CPAP with 2L overnight. Breath sounds good. Pt resting, will continue to monitor.

## 2022-04-28 NOTE — DISCHARGE SUMMARY
[facility] MEDICINE  DISCHARGE SUMMARY     Primary Care Physician: Essie Huitron  Admission Date: 4/22/2022   Discharge Provider: Lynn Liz MD Discharge Date: 4/28/2022   Diet:   Active Diet and Nourishment Order   Procedures     Fluid restriction 2000 ML FLUID     Combination Diet Gluten Free Diet; 2 gm NA Diet     Diet       Code Status: Full Code   Activity: DCACTIVITY: Activity as tolerated        Condition at Discharge: Stable     REASON FOR PRESENTATION(See Admission Note for Details)   Shortness of breath    PRINCIPAL & ACTIVE DISCHARGE DIAGNOSES   Bilateral lower lobe pneumonia, community-acquired  Acute hypoxic respiratory failure  Fall with head laceration  1/2 blood culture positive for staph epidermidis most probably contaminant  Mildly elevated CK  Acute on chronic HFpEF  Obstructive sleep apnea syndrome  CKD 4  Hypothyroidism  Hyperlipidemia  Fibromyalgia  Celiac sprue  Paroxysmal atrial fibrillation        PENDING LABS     Unresulted Labs Ordered in the Past 30 Days of this Admission     Date and Time Order Name Status Description    4/25/2022 12:28 PM Blood Culture Peripheral Blood Preliminary     4/25/2022 12:28 PM Blood Culture Peripheral Blood Preliminary     4/24/2022 11:56 PM Blood Culture Peripheral Blood Preliminary     4/23/2022 10:29 PM Blood Culture Peripheral Blood Preliminary     4/22/2022 10:21 PM Blood Culture Peripheral Blood Preliminary             PROCEDURES ( this hospitalization only)          RECOMMENDATIONS TO OUTPATIENT PROVIDER FOR F/U VISIT     Follow-up Appointments     Follow-up and recommended labs and tests       Follow up with primary care provider, Essie Huitron, within 7 days for   hospital follow- up.  The following labs/tests are recommended: CBC/BMP.    CT scan chest in 6-8 weeks to ensure resolution of infiltrates/ Pneumonia.   Reassess oxygenation .                 DISPOSITION     Home with home care    SUMMARY OF HOSPITAL  COURSE:      Yuki Mock is a 89 year old female with a past medical history of HFpEF, CKD 4, necrotizing crescentic microscopic polyangiitis, ASCVD who was admitted on 4/22/2022 after a fall in her home with a scalp laceration, found to have mildly elevated CK, bilateral pneumonia, was initiated on IV diuresis as well as antibiotics.  1/2 culture positive for staph epi, was started on IV vancomycin along with the continuation of ceftriaxone from admission..   Bilateral pneumonia, community-acquired, treated with ceftriaxone, doxycycline and switched to vancomycin once blood cultures were positive for staph epidermidis but likely contaminant so switch back to ceftriaxone/doxycycline, given persistent mild elevation in WBC completed 10-day course of therapy  SPO2 90 to 92% on room air, required 1 to 2 L of oxygen overnight with a CPAP, home O2 assessment.  Diuresed for acute on chronic CHF, as persistent mild edema, resume torsemide at discharge    Fall with a scalp laceration, staples placed in the ED that can be removed in 5-2/22.  Wound looks fine and healing      Discharge Medications with Med changes:     Current Discharge Medication List      START taking these medications    Details   albuterol (PROAIR HFA/PROVENTIL HFA/VENTOLIN HFA) 108 (90 Base) MCG/ACT inhaler Inhale 2 puffs into the lungs every 6 hours as needed for wheezing  Qty: 18 g, Refills: 0    Comments: Pharmacy may dispense brand covered by insurance (Proair, or proventil or ventolin or generic albuterol inhaler)  Associated Diagnoses: Community acquired pneumonia, unspecified laterality      benzonatate (TESSALON) 100 MG capsule Take 1 capsule (100 mg) by mouth 3 times daily as needed for cough  Qty: 21 capsule, Refills: 0    Associated Diagnoses: Community acquired pneumonia, unspecified laterality      cefdinir (OMNICEF) 300 MG capsule Take 1 capsule (300 mg) by mouth daily for 5 days  Qty: 5 capsule, Refills: 0    Associated Diagnoses:  Community acquired pneumonia, unspecified laterality      doxycycline hyclate (VIBRAMYCIN) 100 MG capsule Take 1 capsule (100 mg) by mouth every 12 hours for 5 days  Qty: 10 capsule, Refills: 0    Associated Diagnoses: Community acquired pneumonia, unspecified laterality      guaiFENesin (ROBITUSSIN) 20 mg/mL SOLN solution Take 10 mLs by mouth every 4 hours as needed for cough    Associated Diagnoses: Community acquired pneumonia, unspecified laterality      omeprazole (PRILOSEC OTC) 20 MG EC tablet Take 1 tablet (20 mg) by mouth daily  Qty: 30 tablet, Refills: 0    Associated Diagnoses: Gastroesophageal reflux disease with esophagitis, unspecified whether hemorrhage         CONTINUE these medications which have NOT CHANGED    Details   acetaminophen (TYLENOL) 500 MG tablet Take 1,000 mg by mouth 3 times daily      amLODIPine (NORVASC) 5 MG tablet Take 5 mg by mouth 2 times daily      carvedilol (COREG) 25 MG tablet Take 25 mg by mouth 2 times daily      gabapentin (NEURONTIN) 100 MG capsule Take 100 mg by mouth 2 times daily      levothyroxine (SYNTHROID/LEVOTHROID) 50 MCG tablet Take 50 mcg by mouth daily      Multiple Vitamins-Minerals (CENTRUM SILVER 50+WOMEN PO) Take 1 tablet by mouth daily      rosuvastatin (CRESTOR) 5 MG tablet Take 5 mg by mouth At Bedtime      torsemide (DEMADEX) 20 MG tablet Take 20-40 mg by mouth See Admin Instructions 2 tabs (40mg) in the morning 1 tab (20 mg) in the evening Continue until your weight is at/below 202 lbs, then stop evening dose and continue only the morning dose of 40 mg once daily.         STOP taking these medications       famotidine (PEPCID) 20 MG tablet Comments:   Reason for Stopping:                     Rationale for medication changes:      Omnicef plus doxycycline for pneumonia  Albuterol for bronchospasms          Consults       PHARMACY IP CONSULT  PHYSICAL THERAPY ADULT IP CONSULT  OCCUPATIONAL THERAPY ADULT IP CONSULT  SOCIAL WORK IP CONSULT  PHARMACY TO  DOSE VANC  CORE CLINIC EVALUATION IP CONSULT  OCCUPATIONAL THERAPY ADULT IP CONSULT  NUTRITION SERVICES ADULT IP CONSULT  CARE MANAGEMENT / SOCIAL WORK IP CONSULT    Immunizations given this encounter       There is no immunization history on file for this patient.        Anticoagulation Information      Recent INR results: No results for input(s): INR in the last 168 hours.  Warfarin doses (if applicable) or name of other anticoagulant:       SIGNIFICANT IMAGING FINDINGS     Results for orders placed or performed during the hospital encounter of 04/22/22   CT Chest Pulmonary Embolism w Contrast    Impression    IMPRESSION:  1.  There is no pulmonary embolus, aortic aneurysm or dissection.  2.  Bilateral multifocal pneumonia.  3.  Small right pleural effusion.  4.  Several mildly enlarged mediastinal and right hilar lymph nodes.  5.  Several small indeterminate lung nodules. Follow-up recommended.    Recommendations for one or multiple incidental lung nodules < 6mm :    Low risk patients: No routine follow-up.    High risk patients: Optional follow-up CT at 12 months; if unchanged, no further follow-up.    *Low Risk: Minimal or absent history of smoking or other known risk factors.  *Nonsolid (ground glass) or partly solid nodules may require longer follow-up to exclude indolent adenocarcinoma.  *Recommendations based on Guidelines for the Management of Incidental Pulmonary Nodules Detected at CT: From the Fleischner Society 2017, Radiology 2017.     US Lower Extremity Venous Duplex Bilateral    Impression    IMPRESSION:  1.  No deep venous thrombosis in the bilateral lower extremities.   CT Lumbar Spine w/o Contrast    Impression    IMPRESSION:  1.  No acute fracture or subluxation.  2.  Advanced multilevel degenerative changes with moderate to severe central canal stenosis at L3-L4 and moderate at L4-L5 and L5-S1.  3.  High-grade foraminal narrowing on the left at L3-L4 and L4-L5.   CT Pelvis Bone wo Contrast     Impression    IMPRESSION:  1.  No definitive evidence for acute displaced fracture or malalignment. Degenerative changes at the symphysis pubis, both SI joints and throughout the lumbar spine.    2.  Subcutaneous edema overlying the pelvis and the proximal thighs.   CT Hip Bilateral w/o Contrast    Impression    IMPRESSION:  1.  Osteopenia. No definitive evidence for acute displaced fracture. If there remains persistent pelvic pain or clinical concern for fracture, consider noncontrast MRI which would be more sensitive for nondisplaced fractures or bone marrow edema.    2.  Degenerative changes at both hips, symphysis pubis and SI joints.    3.  No evidence for intramuscular hematoma. Minimal subcutaneous edema involving the pelvis and proximal thighs.         SIGNIFICANT LABORATORY FINDINGS     Most Recent 3 CBC's:Recent Labs   Lab Test 04/28/22  0525 04/27/22  0514 04/26/22  0955   WBC 12.5* 12.3* 12.6*   HGB 8.6* 8.2* 7.5*   MCV 86 84 85    261 229     Most Recent 3 BMP's:Recent Labs   Lab Test 04/28/22  0525 04/27/22  0515 04/26/22  2040 04/26/22  0955 04/26/22  0516    144  --   --  143   POTASSIUM 3.5 3.5 3.7   < > 3.4*   CHLORIDE 108* 107  --   --  107   CO2 22 24  --   --  23   BUN 32* 39*  --   --  34*   CR 1.63* 1.82*  --   --  1.96*   ANIONGAP 15 13  --   --  13   OWEN 9.2 9.1  --   --  9.1   GLC 99 99  --   --  102    < > = values in this interval not displayed.     Most Recent 2 LFT's:Recent Labs   Lab Test 04/25/22  0107 04/23/22  0345   AST 23 29   ALT 16 14   ALKPHOS 84 76   BILITOTAL 0.4 0.6     Most Recent 3 INR's:No lab results found.        Discharge Orders        Home Care Referral      Reason for your hospital stay    Shortness of breath, bilateral pneumonia     Activity    Your activity upon discharge: activity as tolerated     Follow-up and recommended labs and tests     Follow up with primary care provider, Essie Huitron, within 7 days for hospital follow- up.  The following  labs/tests are recommended: CBC/BMP.  CT scan chest in 6-8 weeks to ensure resolution of infiltrates/ Pneumonia. Reassess oxygenation .  Remove staples from scalp  placed on 4/22/2022 so can be removed 5/2     Oxygen Adult/Peds    Oxygen Documentation:   I certify that this patient, Yuki Mock has been under my care (or a nurse practitioner or physican's assistant working with me). This is the face-to-face encounter for oxygen medical necessity.      Yuki Mock is now in a chronic stable state and continues to require supplemental oxygen. Patient has continued oxygen desaturation due to Chronic Heart Failure I50  NABILA .    Alternative treatment(s) tried or considered and deemed clinically infective for treatment of Chronic Heart Failure I50 include diuretics .  If portability is ordered, is the patient mobile within the home? yes    **Patients who qualify for home O2 coverage under the CMS guidelines require ABG tests or O2 sat readings obtained closest to, but no earlier than 2 days prior to the discharge, as evidence of the need for home oxygen therapy. Testing must be performed while patient is in the chronic stable state. See notes for O2 sats.**     Diet    Follow this diet upon discharge: Orders Placed This Encounter      Fluid restriction 2000 ML FLUID      Combination Diet Gluten Free Diet; 2 gm NA Diet       Examination   Physical Exam   Temp:  [98.4  F (36.9  C)-99.1  F (37.3  C)] 99  F (37.2  C)  Pulse:  [63-76] 76  Resp:  [18-20] 20  BP: (143-169)/(61-72) 169/72  SpO2:  [90 %-96 %] 90 %  Wt Readings from Last 1 Encounters:   04/28/22 95.9 kg (211 lb 8 oz)       Obese female laying in the bed appears comfortable  Lungs rales at the bases, no rhonchi  Heart: S1, S2  Abdomen soft nontender  Extremities mild pitting edema        Please see EMR for more detailed significant labs, imaging, consultant notes etc.    ILynn MD, personally saw the patient today and spent greater than  30 minutes discharging this patient.    Lynn Liz MD  [facility]    CC:Essie Huitron

## 2022-04-28 NOTE — PLAN OF CARE
Occupational Therapy Discharge Summary    Reason for therapy discharge:    Discharged to home.    Progress towards therapy goal(s). See goals on Care Plan in [facility] electronic health record for goal details.  Goals not met.  Barriers to achieving goals:   discharge from facility.    Therapy recommendation(s):    Continued therapy is recommended.  Rationale/Recommendations:  not at baseline.

## 2022-04-28 NOTE — PROGRESS NOTES
Care Management Discharge Note    Discharge Date: 04/28/2022       Discharge Disposition: Home    Discharge Services: None    Discharge DME:  (TBD- possible home 02 evaluation)    Discharge Transportation: family or friend will provide    Patient/Family in Agreement with the Plan: yes    Additional Information:  Pt to discharge home with family as needed. Declines to have TCU or Home care at this time. Family is able to be with the pt as needed 24/7.     Pt to have home 02 Assessment to see if home 02 is needed at night    CM met with the Pt as needed and she declines home care services at this home     YEMI Gonzalez

## 2022-04-28 NOTE — PROGRESS NOTES
Patient has been assessed for Home Oxygen needs.     Pulse oximetry (SpO2) and Oxygen flow readings:    SpO2 = 93% on room air at rest while awake.    SpO2 improved to  % on 0 liters/minute at rest.    SpO2 = 90% on room air during activity/with exercise.    *SpO2 improved to  % on 0 liters/minute during activity/with exercise.      Pt did not require oxygen at rest while awake or with activity. While inpt, pt has required 1-2L each night w CPAP during sleep to maintain sats > 90%. MD notified of results.

## 2022-04-29 ENCOUNTER — PATIENT OUTREACH (OUTPATIENT)
Dept: CARE COORDINATION | Facility: CLINIC | Age: 87
End: 2022-04-29
Payer: COMMERCIAL

## 2022-04-29 DIAGNOSIS — Z71.89 OTHER SPECIFIED COUNSELING: ICD-10-CM

## 2022-04-29 LAB — BACTERIA BLD CULT: NO GROWTH

## 2022-04-29 NOTE — PROGRESS NOTES
1126am:  Received initial oxygen intake.  Reviewed chart and walk test shows non qualifying o2 sats.  I looked at the order and O2 is for NOC only.   1145am:  Called and spoke with nurse.  Asked her what DME patient uses for her CPAP?  She asked the patient and was told [facility].  Patient chooses to use [facility] for her Oxygen.  I let nurse know that I will fax all documents over to [facility] to process.   1150am: Spoke with Jan at [facility] and let him know that this patient is a current CPAP patient with their company and would like to use them for O2.  He states I can fax documents over and will call with questions.    1158am - Sent all docs for O2 to [facility] at: [phone].  Fax machines shows documents went through.   1914pm:  Received a page via on call phone from patient daughter, Emma.  I returned her call and she states her mom never received the Oxygen that was ordered at [facility].  I explained that patient is currently getting CPAP supplies from [facility], the patient chose to go through them for O2. Also stated I faxed all documents to [facility].  I gave her the phone number to [facility] ([phone]) to call to find out if they will deliver.    1921pm:  I placed a call to [facility] myself and spoke with Angus.  He states patients dtr called too and he will look into it and call me back.  1948pm:  Jason calls back and claims they never received the fax.  I state I can refax right now.  He states they have to check insurance before they can deliver.  I state she is a current CPAP patient with you and you have her insurance on file and it's valid.  He states they wont deliver until tomorrow.    2007pm:  Called and spoke to Emma (pt dtr), let her know the situation and if they are ok with [facility] servicing them for O2, I can send a  out tonight with the O2.  She agrees to [facility] servicing patient for O2 needs.  Will send the  with concentrator and CPAP adaptor.    2118pm:  Patient was serviced with O2.

## 2022-04-29 NOTE — PROGRESS NOTES
"Clinic Care Coordination Contact  [facility]: Post-Discharge Note  SITUATION                                                      Admission:    Admission Date: 04/22/22   Reason for Admission: Shortness of breath  Discharge:   Discharge Date: 04/28/22  Discharge Diagnosis: Pneumonia of both lower lobes due to infectious organism    BACKGROUND                                                      Per hospital discharge summary and inpatient provider notes:  Yuki Mock is a 89 year old female with a past medical history of HFpEF, CKD 4, necrotizing crescentic microscopic polyangiitis, ASCVD who was admitted on 4/22/2022 after a fall in her home with a scalp laceration, found to have mildly elevated CK, bilateral pneumonia, was initiated on IV diuresis as well as antibiotics.  1/2 culture positive for staph epi, was started on IV vancomycin along with the continuation of ceftriaxone from admission..   Bilateral pneumonia, community-acquired, treated with ceftriaxone, doxycycline and switched to vancomycin once blood cultures were positive for staph epidermidis but likely contaminant so switch back to ceftriaxone/doxycycline, given persistent mild elevation in WBC completed 10-day course of therapy  SPO2 90 to 92% on room air, required 1 to 2 L of oxygen overnight with a CPAP, home O2 assessment.  Diuresed for acute on chronic CHF, as persistent mild edema, resume torsemide at discharge     Fall with a scalp laceration, staples placed in the ED that can be removed in 5-2/22.  Wound looks fine and healing       ASSESSMENT      Enrollment  Primary Care Care Coordination Status: Unable to Reach    Discharge Assessment  How are you doing now that you are home?: \" I'm doing\"  How are your symptoms? (Red Flag symptoms escalate to triage hotline per guidelines): Improved  Do you feel your condition is stable enough to be safe at home until your provider visit?: Yes  Does the patient have their discharge " instructions? : Yes  Does the patient have questions regarding their discharge instructions? : No  Were you started on any new medications or were there changes to any of your previous medications? : Yes  Does the patient have all of their medications?: Yes  Do you have questions regarding any of your medications? : No  Do you have all of your needed medical supplies or equipment (DME)?  (i.e. oxygen tank, CPAP, cane, etc.): Yes  Discharge follow-up appointment scheduled within 14 calendar days? : Yes  Discharge Follow Up Appointment Date: 05/06/22  Discharge Follow Up Appointment Scheduled with?: Specialty Care Provider    Post-op ([facility] Only)  If the patient had a surgery or procedure, do they have any questions for a nurse?: No             PLAN                                                      Outpatient Plan:    Your activity upon discharge: activity as tolerated          Follow-up and recommended labs and tests      Follow up with primary care provider, Essie Huitron, within 7 days for hospital follow- up.  The following labs/tests are recommended: CBC/BMP.  CT scan chest in 6-8 weeks to ensure resolution of infiltrates/ Pneumonia. Reassess oxygenation .  Remove staples from scalp  placed on 4/22/2022 so can be removed 5/2          Oxygen Adult/Peds     Oxygen Documentation:   I certify that this patient, Yuki Mock has been under my care (or a nurse practitioner or physican's assistant working with me). This is the face-to-face encounter for oxygen medical necessity.       Yuki Mock is now in a chronic stable state and continues to require supplemental oxygen. Patient has continued oxygen desaturation due to Chronic Heart Failure I50  NABILA .     Alternative treatment(s) tried or considered and deemed clinically infective for treatment of Chronic Heart Failure I50 include diuretics .  If portability is ordered, is the patient mobile within the home? yes     **Patients who qualify for  home O2 coverage under the CMS guidelines require ABG tests or O2 sat readings obtained closest to, but no earlier than 2 days prior to the discharge, as evidence of the need for home oxygen therapy. Testing must be performed while patient is in the chronic stable state. See notes for O2 sats.**          Diet     Follow this diet upon discharge: Orders Placed This Encounter      Fluid restriction 2000 ML FLUID      Combination Diet Gluten Free Diet; 2 gm NA Diet          Future Appointments   Date Time Provider Department Center   5/6/2022  2:10 PM [facility]   5/6/2022  2:50 PM ESTRELLA [facility] HEART FAILURE RN [facility]         For any urgent concerns, please contact our 24 hour nurse triage line: [phone] ([phone])         Kendra Oakes MA

## 2022-04-30 LAB
BACTERIA BLD CULT: NO GROWTH